# Patient Record
Sex: MALE | Race: BLACK OR AFRICAN AMERICAN | Employment: OTHER | URBAN - METROPOLITAN AREA
[De-identification: names, ages, dates, MRNs, and addresses within clinical notes are randomized per-mention and may not be internally consistent; named-entity substitution may affect disease eponyms.]

---

## 2018-02-07 ENCOUNTER — HOSPITAL ENCOUNTER (OUTPATIENT)
Dept: PREADMISSION TESTING | Age: 73
Discharge: HOME | End: 2018-02-07
Payer: MEDICARE

## 2018-02-07 VITALS — WEIGHT: 168 LBS | BODY MASS INDEX: 27 KG/M2 | HEIGHT: 66 IN

## 2018-02-07 LAB
ANION GAP SERPL CALC-SCNC: 12 MMOL/L (ref 3–18)
ATRIAL RATE: 84 BPM
BUN SERPL-MCNC: 19 MG/DL (ref 7–18)
BUN/CREAT SERPL: 14 (ref 12–20)
CALCIUM SERPL-MCNC: 11.2 MG/DL (ref 8.5–10.1)
CALCULATED P AXIS, ECG09: 53 DEGREES
CALCULATED R AXIS, ECG10: -13 DEGREES
CALCULATED T AXIS, ECG11: 30 DEGREES
CHLORIDE SERPL-SCNC: 100 MMOL/L (ref 100–108)
CO2 SERPL-SCNC: 26 MMOL/L (ref 21–32)
CREAT SERPL-MCNC: 1.36 MG/DL (ref 0.6–1.3)
DIAGNOSIS, 93000: NORMAL
GLUCOSE SERPL-MCNC: 137 MG/DL (ref 74–99)
HCT VFR BLD AUTO: 42.8 % (ref 36–48)
HGB BLD-MCNC: 14.8 G/DL (ref 13–16)
P-R INTERVAL, ECG05: 200 MS
POTASSIUM SERPL-SCNC: 3.7 MMOL/L (ref 3.5–5.5)
Q-T INTERVAL, ECG07: 360 MS
QRS DURATION, ECG06: 102 MS
QTC CALCULATION (BEZET), ECG08: 425 MS
SODIUM SERPL-SCNC: 138 MMOL/L (ref 136–145)
VENTRICULAR RATE, ECG03: 84 BPM

## 2018-02-07 PROCEDURE — 85018 HEMOGLOBIN: CPT | Performed by: UROLOGY

## 2018-02-07 PROCEDURE — 80048 BASIC METABOLIC PNL TOTAL CA: CPT | Performed by: UROLOGY

## 2018-02-07 PROCEDURE — 93005 ELECTROCARDIOGRAM TRACING: CPT

## 2018-02-07 RX ORDER — ATORVASTATIN CALCIUM 40 MG/1
40 TABLET, FILM COATED ORAL
COMMUNITY

## 2018-02-07 RX ORDER — CYCLOSPORINE 0.5 MG/ML
1 EMULSION OPHTHALMIC 2 TIMES DAILY
COMMUNITY

## 2018-02-07 RX ORDER — MELATONIN
8000 DAILY
COMMUNITY

## 2018-02-07 RX ORDER — LOSARTAN POTASSIUM 100 MG/1
100 TABLET ORAL DAILY
COMMUNITY

## 2018-02-07 RX ORDER — AMLODIPINE BESYLATE 10 MG/1
10 TABLET ORAL DAILY
COMMUNITY

## 2018-02-07 RX ORDER — ASPIRIN 81 MG/1
81 TABLET ORAL
COMMUNITY
End: 2021-04-13

## 2018-02-07 RX ORDER — TIZANIDINE HYDROCHLORIDE 4 MG/1
4 CAPSULE, GELATIN COATED ORAL
COMMUNITY
End: 2021-04-13

## 2018-02-07 RX ORDER — CHLORTHALIDONE 25 MG/1
25 TABLET ORAL EVERY OTHER DAY
Status: ON HOLD | COMMUNITY
End: 2022-10-28

## 2018-02-07 RX ORDER — MV/FA/DHA/EPA/FISH OIL/SAW/GNK 400MCG-200
400 COMBINATION PACKAGE (EA) ORAL DAILY
Status: ON HOLD | COMMUNITY
End: 2022-10-28

## 2018-02-07 RX ORDER — TAMSULOSIN HYDROCHLORIDE 0.4 MG/1
0.4 CAPSULE ORAL
COMMUNITY
End: 2019-09-12

## 2018-02-07 RX ORDER — OMEPRAZOLE 40 MG/1
40 CAPSULE, DELAYED RELEASE ORAL
COMMUNITY
End: 2021-04-13

## 2018-02-07 RX ORDER — IBUPROFEN 800 MG/1
800 TABLET ORAL
COMMUNITY
End: 2021-04-13

## 2018-02-07 RX ORDER — BISMUTH SUBSALICYLATE 262 MG
1 TABLET,CHEWABLE ORAL DAILY
COMMUNITY
End: 2021-04-13

## 2018-02-07 RX ORDER — SODIUM CHLORIDE, SODIUM LACTATE, POTASSIUM CHLORIDE, CALCIUM CHLORIDE 600; 310; 30; 20 MG/100ML; MG/100ML; MG/100ML; MG/100ML
125 INJECTION, SOLUTION INTRAVENOUS CONTINUOUS
Status: CANCELLED | OUTPATIENT
Start: 2018-02-07

## 2018-02-07 RX ORDER — CIPROFLOXACIN 500 MG/1
500 TABLET ORAL EVERY 12 HOURS
COMMUNITY
End: 2019-09-12

## 2018-02-07 RX ORDER — CIPROFLOXACIN 2 MG/ML
400 INJECTION, SOLUTION INTRAVENOUS ONCE
Status: CANCELLED | OUTPATIENT
Start: 2018-02-07 | End: 2018-02-07

## 2018-02-20 ENCOUNTER — HOSPITAL ENCOUNTER (OUTPATIENT)
Dept: ULTRASOUND IMAGING | Age: 73
Discharge: HOME | End: 2018-02-20
Attending: UROLOGY
Payer: MEDICARE

## 2018-02-20 ENCOUNTER — ANESTHESIA EVENT (OUTPATIENT)
Dept: SURGERY | Age: 73
End: 2018-02-20
Payer: MEDICARE

## 2018-02-20 ENCOUNTER — HOSPITAL ENCOUNTER (OUTPATIENT)
Age: 73
Setting detail: OUTPATIENT SURGERY
Discharge: HOME | End: 2018-02-20
Attending: UROLOGY | Admitting: UROLOGY
Payer: MEDICARE

## 2018-02-20 ENCOUNTER — ANESTHESIA (OUTPATIENT)
Dept: SURGERY | Age: 73
End: 2018-02-20
Payer: MEDICARE

## 2018-02-20 VITALS
SYSTOLIC BLOOD PRESSURE: 126 MMHG | RESPIRATION RATE: 18 BRPM | DIASTOLIC BLOOD PRESSURE: 68 MMHG | HEIGHT: 67 IN | WEIGHT: 165.38 LBS | HEART RATE: 78 BPM | OXYGEN SATURATION: 100 % | TEMPERATURE: 97.5 F | BODY MASS INDEX: 25.96 KG/M2

## 2018-02-20 DIAGNOSIS — R97.20 ELEVATED PSA: ICD-10-CM

## 2018-02-20 PROCEDURE — 74011000250 HC RX REV CODE- 250

## 2018-02-20 PROCEDURE — 77030032490 HC SLV COMPR SCD KNE COVD -B: Performed by: UROLOGY

## 2018-02-20 PROCEDURE — 76010000154 HC OR TIME FIRST 0.5 HR: Performed by: UROLOGY

## 2018-02-20 PROCEDURE — 74011250636 HC RX REV CODE- 250/636

## 2018-02-20 PROCEDURE — 74011250636 HC RX REV CODE- 250/636: Performed by: UROLOGY

## 2018-02-20 PROCEDURE — 76942 ECHO GUIDE FOR BIOPSY: CPT

## 2018-02-20 PROCEDURE — 77030018836 HC SOL IRR NACL ICUM -A: Performed by: UROLOGY

## 2018-02-20 PROCEDURE — 77030020782 HC GWN BAIR PAWS FLX 3M -B: Performed by: UROLOGY

## 2018-02-20 PROCEDURE — 77030003481 HC NDL BIOP GUN BARD -B: Performed by: UROLOGY

## 2018-02-20 PROCEDURE — 76060000031 HC ANESTHESIA FIRST 0.5 HR: Performed by: UROLOGY

## 2018-02-20 PROCEDURE — 76210000024 HC REC RM PH II 2.5 TO 3 HR: Performed by: UROLOGY

## 2018-02-20 PROCEDURE — G0416 PROSTATE BIOPSY, ANY MTHD: HCPCS | Performed by: UROLOGY

## 2018-02-20 RX ORDER — DIPHENHYDRAMINE HYDROCHLORIDE 50 MG/ML
12.5 INJECTION, SOLUTION INTRAMUSCULAR; INTRAVENOUS
Status: CANCELLED | OUTPATIENT
Start: 2018-02-20

## 2018-02-20 RX ORDER — CIPROFLOXACIN 2 MG/ML
400 INJECTION, SOLUTION INTRAVENOUS ONCE
Status: COMPLETED | OUTPATIENT
Start: 2018-02-20 | End: 2018-02-20

## 2018-02-20 RX ORDER — ONDANSETRON 2 MG/ML
4 INJECTION INTRAMUSCULAR; INTRAVENOUS ONCE
Status: CANCELLED | OUTPATIENT
Start: 2018-02-20 | End: 2018-02-20

## 2018-02-20 RX ORDER — HYDROMORPHONE HYDROCHLORIDE 2 MG/ML
0.5 INJECTION, SOLUTION INTRAMUSCULAR; INTRAVENOUS; SUBCUTANEOUS
Status: CANCELLED | OUTPATIENT
Start: 2018-02-20

## 2018-02-20 RX ORDER — PROPOFOL 10 MG/ML
INJECTION, EMULSION INTRAVENOUS AS NEEDED
Status: DISCONTINUED | OUTPATIENT
Start: 2018-02-20 | End: 2018-02-20 | Stop reason: HOSPADM

## 2018-02-20 RX ORDER — SODIUM CHLORIDE, SODIUM LACTATE, POTASSIUM CHLORIDE, CALCIUM CHLORIDE 600; 310; 30; 20 MG/100ML; MG/100ML; MG/100ML; MG/100ML
125 INJECTION, SOLUTION INTRAVENOUS CONTINUOUS
Status: DISCONTINUED | OUTPATIENT
Start: 2018-02-20 | End: 2018-02-20 | Stop reason: HOSPADM

## 2018-02-20 RX ORDER — FENTANYL CITRATE 50 UG/ML
INJECTION, SOLUTION INTRAMUSCULAR; INTRAVENOUS AS NEEDED
Status: DISCONTINUED | OUTPATIENT
Start: 2018-02-20 | End: 2018-02-20 | Stop reason: HOSPADM

## 2018-02-20 RX ORDER — NALOXONE HYDROCHLORIDE 0.4 MG/ML
0.1 INJECTION, SOLUTION INTRAMUSCULAR; INTRAVENOUS; SUBCUTANEOUS AS NEEDED
Status: CANCELLED | OUTPATIENT
Start: 2018-02-20

## 2018-02-20 RX ORDER — LIDOCAINE HYDROCHLORIDE 20 MG/ML
INJECTION, SOLUTION EPIDURAL; INFILTRATION; INTRACAUDAL; PERINEURAL AS NEEDED
Status: DISCONTINUED | OUTPATIENT
Start: 2018-02-20 | End: 2018-02-20 | Stop reason: HOSPADM

## 2018-02-20 RX ORDER — SODIUM CHLORIDE 0.9 % (FLUSH) 0.9 %
5-10 SYRINGE (ML) INJECTION AS NEEDED
Status: CANCELLED | OUTPATIENT
Start: 2018-02-20

## 2018-02-20 RX ORDER — SODIUM CHLORIDE, SODIUM LACTATE, POTASSIUM CHLORIDE, CALCIUM CHLORIDE 600; 310; 30; 20 MG/100ML; MG/100ML; MG/100ML; MG/100ML
150 INJECTION, SOLUTION INTRAVENOUS CONTINUOUS
Status: CANCELLED | OUTPATIENT
Start: 2018-02-20

## 2018-02-20 RX ORDER — HYDROCODONE BITARTRATE AND ACETAMINOPHEN 5; 325 MG/1; MG/1
1 TABLET ORAL AS NEEDED
Status: CANCELLED | OUTPATIENT
Start: 2018-02-20

## 2018-02-20 RX ORDER — GLYCOPYRROLATE 0.2 MG/ML
INJECTION INTRAMUSCULAR; INTRAVENOUS AS NEEDED
Status: DISCONTINUED | OUTPATIENT
Start: 2018-02-20 | End: 2018-02-20 | Stop reason: HOSPADM

## 2018-02-20 RX ORDER — MAGNESIUM SULFATE 100 %
4 CRYSTALS MISCELLANEOUS AS NEEDED
Status: CANCELLED | OUTPATIENT
Start: 2018-02-20

## 2018-02-20 RX ORDER — MIDAZOLAM HYDROCHLORIDE 1 MG/ML
INJECTION, SOLUTION INTRAMUSCULAR; INTRAVENOUS AS NEEDED
Status: DISCONTINUED | OUTPATIENT
Start: 2018-02-20 | End: 2018-02-20 | Stop reason: HOSPADM

## 2018-02-20 RX ORDER — ALBUTEROL SULFATE 0.83 MG/ML
2.5 SOLUTION RESPIRATORY (INHALATION) AS NEEDED
Status: CANCELLED | OUTPATIENT
Start: 2018-02-20

## 2018-02-20 RX ORDER — DEXTROSE 50 % IN WATER (D50W) INTRAVENOUS SYRINGE
25-50 AS NEEDED
Status: CANCELLED | OUTPATIENT
Start: 2018-02-20

## 2018-02-20 RX ADMIN — SODIUM CHLORIDE, SODIUM LACTATE, POTASSIUM CHLORIDE, AND CALCIUM CHLORIDE 125 ML/HR: 600; 310; 30; 20 INJECTION, SOLUTION INTRAVENOUS at 09:44

## 2018-02-20 RX ADMIN — FENTANYL CITRATE 50 MCG: 50 INJECTION, SOLUTION INTRAMUSCULAR; INTRAVENOUS at 10:30

## 2018-02-20 RX ADMIN — MIDAZOLAM HYDROCHLORIDE 2 MG: 1 INJECTION, SOLUTION INTRAMUSCULAR; INTRAVENOUS at 10:30

## 2018-02-20 RX ADMIN — PROPOFOL 30 MG: 10 INJECTION, EMULSION INTRAVENOUS at 10:39

## 2018-02-20 RX ADMIN — PROPOFOL 30 MG: 10 INJECTION, EMULSION INTRAVENOUS at 10:36

## 2018-02-20 RX ADMIN — CIPROFLOXACIN 400 MG: 2 INJECTION INTRAVENOUS at 10:34

## 2018-02-20 RX ADMIN — LIDOCAINE HYDROCHLORIDE 60 MG: 20 INJECTION, SOLUTION EPIDURAL; INFILTRATION; INTRACAUDAL; PERINEURAL at 10:36

## 2018-02-20 RX ADMIN — FENTANYL CITRATE 50 MCG: 50 INJECTION, SOLUTION INTRAMUSCULAR; INTRAVENOUS at 10:34

## 2018-02-20 RX ADMIN — SODIUM CHLORIDE, SODIUM LACTATE, POTASSIUM CHLORIDE, AND CALCIUM CHLORIDE 125 ML/HR: 600; 310; 30; 20 INJECTION, SOLUTION INTRAVENOUS at 11:57

## 2018-02-20 RX ADMIN — GLYCOPYRROLATE 0.2 MG: 0.2 INJECTION INTRAMUSCULAR; INTRAVENOUS at 10:35

## 2018-02-20 NOTE — DISCHARGE INSTRUCTIONS
Drink plenty of fluids  Regular diet  Shower tomorrow  Avoid heavy lifting, avoid straining,avoid constipation  Dr. Everette Saini office will call with follow up appointment  Contact Dr. Everette Saini office with any Post op questions or concerns at 36 - [phone] [facility] from Nurse    PATIENT INSTRUCTIONS:    After general anesthesia or intravenous sedation, for 24 hours or while taking prescription Narcotics:  · Limit your activities  · Do not drive and operate hazardous machinery  · Do not make important personal or business decisions  · Do  not drink alcoholic beverages  · If you have not urinated within 8 hours after discharge, please contact your surgeon on call. Report the following to your surgeon:  · Excessive pain, swelling, redness or odor of or around the surgical area  · Temperature over 100.5  · Nausea and vomiting lasting longer than 4 hours or if unable to take medications  · Any signs of decreased circulation or nerve impairment to extremity: change in color, persistent  numbness, tingling, coldness or increase pain  · Any questions    What to do at Home:  Recommended activity: Ambulate in house,    If you experience any of the following symptoms fever, chills, uncontrollable pain, active bleeding, difficulty urinating, please follow up with Dr. Luan Becker. *  Please give a list of your current medications to your Primary Care Provider. *  Please update this list whenever your medications are discontinued, doses are      changed, or new medications (including over-the-counter products) are added. *  Please carry medication information at all times in case of emergency situations. These are general instructions for a healthy lifestyle:    No smoking/ No tobacco products/ Avoid exposure to second hand smoke  Surgeon General's Warning:  Quitting smoking now greatly reduces serious risk to your health.     Obesity, smoking, and sedentary lifestyle greatly increases your risk for illness    A healthy diet, regular physical exercise & weight monitoring are important for maintaining a healthy lifestyle    You may be retaining fluid if you have a history of heart failure or if you experience any of the following symptoms:  Weight gain of 3 pounds or more overnight or 5 pounds in a week, increased swelling in our hands or feet or shortness of breath while lying flat in bed. Please call your doctor as soon as you notice any of these symptoms; do not wait until your next office visit. Recognize signs and symptoms of STROKE:    F-face looks uneven    A-arms unable to move or move unevenly    S-speech slurred or non-existent    T-time-call 911 as soon as signs and symptoms begin-DO NOT go       Back to bed or wait to see if you get better-TIME IS BRAIN. Warning Signs of HEART ATTACK     Call 911 if you have these symptoms:   Chest discomfort. Most heart attacks involve discomfort in the center of the chest that lasts more than a few minutes, or that goes away and comes back. It can feel like uncomfortable pressure, squeezing, fullness, or pain.  Discomfort in other areas of the upper body. Symptoms can include pain or discomfort in one or both arms, the back, neck, jaw, or stomach.  Shortness of breath with or without chest discomfort.  Other signs may include breaking out in a cold sweat, nausea, or lightheadedness. Don't wait more than five minutes to call 911 - MINUTES MATTER! Fast action can save your life. Calling 911 is almost always the fastest way to get lifesaving treatment. Emergency Medical Services staff can begin treatment when they arrive -- up to an hour sooner than if someone gets to the hospital by car. Patient armband removed and shredded    The discharge information has been reviewed with the patient and caregiver. The patient and caregiver verbalized understanding.   Discharge medications reviewed with the patient and caregiver and appropriate educational materials and side effects teaching were provided.   ___________________________________________________________________________________________________________________________________

## 2018-02-20 NOTE — PERIOP NOTES
Patient ambulatory to bathroom but unable to urinate. Given more PO fluids, and additional liter of LR infusing at this time.

## 2018-02-20 NOTE — ANESTHESIA PREPROCEDURE EVALUATION
Anesthetic History   No history of anesthetic complications            Review of Systems / Medical History  Patient summary reviewed, nursing notes reviewed and pertinent labs reviewed    Pulmonary  Within defined limits                 Neuro/Psych   Within defined limits           Cardiovascular    Hypertension: well controlled              Exercise tolerance: >4 METS     GI/Hepatic/Renal     GERD           Endo/Other        Arthritis     Other Findings              Physical Exam    Airway  Mallampati: II  TM Distance: 4 - 6 cm  Neck ROM: normal range of motion        Cardiovascular  Regular rate and rhythm,  S1 and S2 normal,  no murmur, click, rub, or gallop             Dental  No notable dental hx       Pulmonary  Breath sounds clear to auscultation               Abdominal  GI exam deferred       Other Findings            Anesthetic Plan    ASA: 2  Anesthesia type: general          Induction: Intravenous  Anesthetic plan and risks discussed with: Patient

## 2018-02-20 NOTE — H&P
A    [facility]  [address] [place] [address], [address] [place] [address], [address] [place] [address]  phone: [phone]  fax: [phone]          Patient: Mikie Luther  YOB: 1945      Assessment/Plan  # Detail Type Description    1. Assessment Elevated prostate specific antigen [PSA] (R97.20). Patient Plan He has a family history of prostate cancer and he has an elevated PSA. We will do a prostate bx in the OR. Risk of bleeding and infection were discussed. An rx fro Cipro was sent to [facility]. 2. Assessment Enlarged prostate with lower urinary tract symptoms (N40.1). Patient Plan He does have a pretty big prostate greater than 60g on LIAM. He did not see much improvement with his sx with Flomax. We discussed Rapaflo therapy vs surgical tx and he is not interested at this point. 3. Assessment Feeling of incomplete bladder emptying (R39.14). Patient Plan His PVR is 58ml he is not too bothered. This 81year old male presents for Elevated PSA and BPH. History of Present Illness:  1. Elevated PSA      The onset was approximately 1 month ago. The patient's PSA has risen since last monitored. Severity level is described as moderate. Reviewed today was a  PSA taken on 2018 with findings of 4.11 ng/mL. Pertinent history includes age over 48, family history of prostate cancer, prior prostatitis and prior UTIs. Associated symptoms include incomplete emptying, nocturia , slow stream, urinary urgency, urinary dribbling and frequent urination. Pertinent negatives include dysuria, hematuria, pelvic pain and pressure. Additional information: no recent UTI, the patient is sexually active and He ahs father and brother with a h/o prostate cancer (brother was about 61years old andfather  from it.)   No prior h/o prostate biopsy. He was told that prior PSA have been between 3-4. .      2.  BPH      Onset was gradual. Severity level is moderate-severe. It occurs daily. The problem is with no change. Associated symptoms include incomplete emptying, nocturia (3 times per night), slow stream, urgency, dribbling and urinary frequency. Pertinent negatives include chills, constipation, dysuria, fever, hematuria, pelvic pain, pelvic pressure and pressure. Additional information: Her has never had any BPH meds in the past.    He started flomax and has nocturia is 0-3 times and the flow is unchnged. He is not bothered at this point. PVR=58 (1/31/18). PAST MEDICAL/SURGICAL HISTORY   (Reviewed, updated)    Disease/disorder Onset Date Management Date Comments     cataract removal surgery     Arthritis       chronic dry eyes       High cholesterol       Hypoparathyroidism       Sciatica       Prostatitis       Hypertension             PROBLEM LIST:  Problem Description Onset Date   Hypoparathyroidism    Hypertension    High cholesterol    Arthritis    Dry eyes    Sciatica      Medication Reconciliation  Medications reconciled today.   Medication Reviewed  Adherence Medication Name Sig Desc Elsewhere Status   taking as directed amlodipine 10 mg tablet take 1 tablet by oral route  every day Y Verified   taking as directed atorvastatin 40 mg tablet take 1 tablet by oral route  every day Y Verified   taking as directed chlorthalidone 25 mg tablet take 0.5 tablet by oral route  every day Y Verified   taking as directed ibuprofen 800 mg tablet take 1 tablet by oral route 3 times every day with food Y Verified   taking as directed Restasis 0.05 % eye drops in a dropperette instill 1 drop by ophthalmic route  every 12 hours into affected eye(s) Y Verified   taking as directed tizanidine 4 mg tablet take 1 tablet by oral route  every 8 hours as needed not to exceed 3 doses in 24 hours Y Verified   taking as directed Nature's Tears 0.1 %-0.3 % eye drops use as directed N Verified   taking as directed Viagra 50 mg tablet take 1 tablet by oral route  every day as needed approximately 1 hour before sexual activity Y Verified   taking as directed GenTeal Tears (dxtrn-hpm-gly) 0.1 %-0.3 %-0.2 % eye drops use as directed N Verified   taking as directed CoQ-10 100 mg capsule take 1 capsule by mouth once daily N Verified   taking as directed multivitamin tablet take 1 tablet by oral route  every day with food N Verified   taking as directed krill oil 1,000 mg-170 mg-50 mg-80 mg capsule take 1 capsule by mouth once daily N Verified   taking as directed Vitamin D3 400 unit tablet take 1 tablet by  mouth once daily N Verified   taking as directed Flomax 0.4 mg capsule take 1 capsule by oral route  every day 1/2 hour following the same meal each day N Verified     Allergies  Ingredient Reaction Medication Name Comment   NO KNOWN ALLERGIES        (Reviewed, no changes.)  Family History:  (Reviewed, updated)  Relationship Family Member Name  Age at Death Condition Onset Age Cause of Death   Family h/o    Cancer - prostate     Family h/o    Hypertension     Family h/o    Diabetes     Mother    Kidney disease  Y         Social History:  (Reviewed, updated)  Tobacco use reviewed. The patient is right-handed. Preferred language is [place]. MARITAL STATUS/FAMILY/SOCIAL SUPPORT  Currently . CHILDREN  Has children:  Tobacco use status: Ex-cigarette smoker. Smoking status: Former smoker. SMOKING STATUS  Use Status Type Smoking Status Usage Per Day Years Used Total Pack Years   yes Cigarette Former smoker          No passive smoke exposure. ALCOHOL  There is a history of alcohol use. Type: Beer. consumed occasionally. CAFFEINE  The patient uses caffeine: coffee and tea, soda. Arvid Tyler LIFESTYLE  Moderate activity level. SLEEP PATTERNS  Patient has no changes to sleep patterns. HOME ENVIRONMENT/SAFETY  The home has smoke detectors. Uses seat belts. Review of Systems  System Neg/Pos Details   Constitutional Negative Chills and fever.    ENMT Negative Ear infections and sore throat. Eyes Negative Blurred vision, double vision and eye pain. Respiratory Negative Asthma, chronic cough, dyspnea and wheezing. Cardio Negative Chest pain. GI Negative Constipation, decreased appetite, diarrhea, nausea and vomiting.  Positive Incomplete emptying, Nocturia, Slow stream, Urgency, Urinary dribbling, Urinary frequency.  Negative Dysuria, hematuria, pelvic pain, pelvic pressure and pressure. Endocrine Negative Cold intolerance, heat intolerance, increased thirst and weight loss. Neuro Negative Headache and tremors. Psych Negative Anxiety and depression. Integumentary Negative Itching skin and rash. MS Negative Back pain and joint pain. Hema/Lymph Negative Easy bleeding. Reproductive Negative Sexual dysfunction. Physical Exam  Exam Findings Details   Constitutional Normal Well developed. Neck Exam Normal Inspection - Normal.   Respiratory Normal Inspection - Normal.   Abdomen Normal No abdominal tenderness. Genitourinary Normal No CVA tenderness. No suprapubic tenderness. Extremity Normal No edema. Psychiatric Normal Oriented to time, place, person and situation. Appropriate mood and affect. Procedures:  Uroflow:  Feeling of incomplete bladder emptying R39.14. Consent was obtained. The procedure and risks were explained in detail. Questions were encouraged and answered. Patient was prepped and draped in the usual fashion. Procedure:   Sonographic residual urine: 58mL. Time after void: 1 Minute. Comments:. Physician: Man Joshua MD. Date: 01/31/2018. Time: 10:44 AM.  Post procedure: Instructions: Raenell Mortimer       Medications (added, continued, or stopped today)  Started Medication Directions Instruction Stopped   12/11/2017 amlodipine 10 mg tablet take 1 tablet by oral route  every day     12/05/2017 atorvastatin 40 mg tablet take 1 tablet by oral route  every day     12/05/2017 chlorthalidone 25 mg tablet take 0.5 tablet by oral route  every day 01/31/2018 Cipro 500 mg tablet take 1 by oral route once for 12 hours start day prior to biopsy Start taking on the day prior to the biopsy    12/14/2017 CoQ-10 100 mg capsule take 1 capsule by mouth once daily     12/14/2017 Flomax 0.4 mg capsule take 1 capsule by oral route  every day 1/2 hour following the same meal each day     12/14/2017 GenTeal Tears (dxtrn-hpm-gly) 0.1 %-0.3 %-0.2 % eye drops use as directed     11/02/2017 ibuprofen 800 mg tablet take 1 tablet by oral route 3 times every day with food     12/14/2017 krill oil 1,000 mg-170 mg-50 mg-80 mg capsule take 1 capsule by mouth once daily     12/14/2017 multivitamin tablet take 1 tablet by oral route  every day with food     12/14/2017 Nature's Tears 0.1 %-0.3 % eye drops use as directed     09/21/2017 Restasis 0.05 % eye drops in a dropperette instill 1 drop by ophthalmic route  every 12 hours into affected eye(s)     11/02/2017 tizanidine 4 mg tablet take 1 tablet by oral route  every 8 hours as needed not to exceed 3 doses in 24 hours     10/16/2017 Viagra 50 mg tablet take 1 tablet by oral route  every day as needed approximately 1 hour before sexual activity     12/14/2017 Vitamin D3 400 unit tablet take 1 tablet by  mouth once daily     Completed by:              Tony Aguiar MD

## 2018-02-20 NOTE — IP AVS SNAPSHOT
Cameron Floyd 
 
 
 [address] [place] [id number] Patient: Jasmin Chou MRN: [id number] About your hospitalization You were admitted on:  February 20, 2018 You last received care in the:  [facility] PHASE 2 RECOVERY You were discharged on:  February 20, 2018 Why you were hospitalized Your primary diagnosis was:  Elevated Psa Follow-up Information Follow up With Details Comments Contact Info Melyssa Marinelli MD   1661 [address] [facility] A [address] [place] [address] 
[phone] Cathi Chapin MD  office will call with follow up appointment [address] [facility] 107 1344 [facility] 
[phone] Discharge Orders None A check juan j indicates which time of day the medication should be taken. My Medications CONTINUE taking these medications Instructions Each Dose to Equal  
 Morning Noon Evening Bedtime  
 amLODIPine 10 mg tablet Commonly known as:  Caitlin James Your last dose was: Your next dose is: Take 10 mg by mouth daily. 10 mg  
    
   
   
   
  
 aspirin delayed-release 81 mg tablet Your last dose was: Your next dose is: Take 81 mg by mouth daily. 81 mg  
    
   
   
   
  
 chlorthalidone 25 mg tablet Commonly known as:  Burke Torres Your last dose was: Your next dose is: Take 25 mg by mouth every other day. 25 mg  
    
   
   
   
  
 CIPRO 500 mg tablet Generic drug:  ciprofloxacin HCl Your last dose was: Your next dose is: Take 500 mg by mouth every twelve (12) hours. Starting the day before surgery 500 mg  
    
   
   
   
  
 COQ10 (UBIQUINOL) PO Your last dose was: Your next dose is: Take  by mouth. 1 Teaspoon oral route daily GENTEAL TEARS OP Your last dose was: Your next dose is: Apply 1 Drop to eye nightly. Both eyes 1 Drop  
    
   
   
   
  
 ibuprofen 800 mg tablet Commonly known as:  MOTRIN Your last dose was: Your next dose is: Take 600 mg by mouth two (2) times daily as needed for Pain. 600 mg  
    
   
   
   
  
 krill oil 500 mg Cap Your last dose was: Your next dose is: Take 500 mg by mouth daily. 500 mg  
    
   
   
   
  
 LIPITOR 40 mg tablet Generic drug:  atorvastatin Your last dose was: Your next dose is: Take 40 mg by mouth nightly. 40 mg  
    
   
   
   
  
 losartan 100 mg tablet Commonly known as:  COZAAR Your last dose was: Your next dose is: Take 100 mg by mouth daily. 100 mg  
    
   
   
   
  
 multivitamin tablet Commonly known as:  ONE A DAY Your last dose was: Your next dose is: Take 1 Tab by mouth daily. 1 Tab NATURAL TEARS (PF) OP Your last dose was: Your next dose is:    
   
   
 Apply 1 Drop to eye two (2) times a day. Both eyes 1 Drop  
    
   
   
   
  
 omeprazole 40 mg capsule Commonly known as:  PRILOSEC Your last dose was: Your next dose is: Take 40 mg by mouth Daily (before breakfast). 40 mg  
    
   
   
   
  
 psyllium Powd Commonly known as:  METAMUCIL Your last dose was: Your next dose is: Take  by mouth. 1 Tablespoon in 8 ounces glass oral route of water every other day RESTASIS 0.05 % ophthalmic emulsion Generic drug:  cycloSPORINE Your last dose was: Your next dose is:    
   
   
 Administer 1 Drop to both eyes two (2) times a day. 1 Drop  
    
   
   
   
  
 tamsulosin 0.4 mg capsule Commonly known as:  FLOMAX Your last dose was: Your next dose is: Take 0.4 mg by mouth daily (after dinner). 0.4 mg  
    
   
   
   
  
 tiZANidine 4 mg capsule Commonly known as:  Amaris Herzog Your last dose was: Your next dose is: Take 4 mg by mouth two (2) times daily as needed. 4 mg VIAGRA PO Your last dose was: Your next dose is: Take 40 mg by mouth as needed. 40 mg  
    
   
   
   
  
 VITAMIN D3 1,000 unit tablet Generic drug:  cholecalciferol Your last dose was: Your next dose is: Take 8,000 Units by mouth daily. 8000 Units Discharge Instructions Drink plenty of fluids Regular diet Shower tomorrow Avoid heavy lifting, avoid straining,avoid constipation Dr. Joaquín Ramachandran office will call with follow up appointment Contact Dr. Joaquín Ramachandran office with any Post op questions or concerns at [phone] DISCHARGE SUMMARY from Nurse PATIENT INSTRUCTIONS: 
 
 
F-face looks uneven A-arms unable to move or move unevenly S-speech slurred or non-existent T-time-call 911 as soon as signs and symptoms begin-DO NOT go Back to bed or wait to see if you get better-TIME IS BRAIN. Warning Signs of HEART ATTACK Call 911 if you have these symptoms: 
? Chest discomfort. Most heart attacks involve discomfort in the center of the chest that lasts more than a few minutes, or that goes away and comes back. It can feel like uncomfortable pressure, squeezing, fullness, or pain. ? Discomfort in other areas of the upper body. Symptoms can include pain or discomfort in one or both arms, the back, neck, jaw, or stomach. ? Shortness of breath with or without chest discomfort. ? Other signs may include breaking out in a cold sweat, nausea, or lightheadedness. Don't wait more than five minutes to call [address]! Fast action can save your life. Calling 911 is almost always the fastest way to get lifesaving treatment. Emergency Medical Services staff can begin treatment when they arrive  up to an hour sooner than if someone gets to the hospital by car. Patient armband removed and shredded The discharge information has been reviewed with the patient and caregiver. The patient and caregiver verbalized understanding. Discharge medications reviewed with the patient and caregiver and appropriate educational materials and side effects teaching were provided. ___________________________________________________________________________________________________________________________________ Introducing [facility] & HEALTH SERVICES! Flakita Noel introduces adflyer patient portal. Now you can access parts of your medical record, email your doctor's office, and request medication refills online. 1. In your internet browser, go to [email or web address] 2. Click on the First Time User? Click Here link in the Sign In box. You will see the New Member Sign Up page. 3. Enter your adflyer Access Code exactly as it appears below. You will not need to use this code after youve completed the sign-up process. If you do not sign up before the expiration date, you must request a new code. · adflyer Access Code: [email or web address] Expires: 5/3/2018  5:57 PM 
 
4. Enter the last four digits of your Social Security Number (xxxx) and Date of Birth (mm/dd/yyyy) as indicated and click Submit. You will be taken to the next sign-up page. 5. Create a adflyer ID. This will be your MyChart login ID and cannot be changed, so think of one that is secure and easy to remember. 6. Create a adflyer password. You can change your password at any time. 7. Enter your Password Reset Question and Answer. This can be used at a later time if you forget your password. 8. Enter your e-mail address. You will receive e-mail notification when new information is available in [address] 9. Click Sign Up. You can now view and download portions of your medical record. 10. Click the Download Summary menu link to download a portable copy of your medical information. If you have questions, please visit the Frequently Asked Questions section of the Netadmin website. Remember, Netadmin is NOT to be used for urgent needs. For medical emergencies, dial 911. Now available from your iPhone and Android! Providers Seen During Your Hospitalization Provider Specialty Primary office phone Maria Fernanda Rios MD Urology [phone] Your Primary Care Physician (PCP) Primary Care Physician Office Phone Office Fax Martin Hawleycrys [phone] You are allergic to the following No active allergies Recent Documentation Height Weight BMI Smoking Status 1.702 m 75 kg 25.9 kg/m2 Former Smoker Emergency Contacts Name Discharge Info Relation Home Work Mobile Pretty Rowell DISCHARGE CAREGIVER [3] Spouse [3]   [phone] Patient Belongings The following personal items are in your possession at time of discharge: 
  Dental Appliances: At home, Lowers  Visual Aid: Glasses      Home Medications: None   Jewelry: None  Clothing: Pants, Shirt, Footwear, Undergarments (locker 10)    Other Valuables: Siobhan Matthews (with spouse) Please provide this summary of care documentation to your next provider. Signatures-by signing, you are acknowledging that this After Visit Summary has been reviewed with you and you have received a copy. Patient Signature:  ____________________________________________________________ Date:  ____________________________________________________________  
  
Juan Miguel Kin Provider Signature:  ____________________________________________________________ Date:  ____________________________________________________________

## 2018-02-20 NOTE — ANESTHESIA POSTPROCEDURE EVALUATION
Post-Anesthesia Evaluation and Assessment    Cardiovascular Function/Vital Signs  Visit Vitals    /68    Pulse 78    Temp 36.4 °C (97.5 °F)    Resp 18    Ht 5' 7\" (1.702 m)    Wt 75 kg (165 lb 6 oz)    SpO2 100%    BMI 25.9 kg/m2       Patient is status post Procedure(s):  TRANSRECTAL ULTRASOUND GUIDED PROSTATE BIOPSY . Nausea/Vomiting: Controlled. Postoperative hydration reviewed and adequate. Pain:  Pain Scale 1: Numeric (0 - 10) (02/20/18 1326)  Pain Intensity 1: 0 (02/20/18 1326)   Managed. Neurological Status:   Neuro (WDL): Within Defined Limits (02/20/18 1326)   At baseline. Mental Status and Level of Consciousness: Arousable. Pulmonary Status:   O2 Device: Room air (02/20/18 1326)   Adequate oxygenation and airway patent. Complications related to anesthesia: None    Post-anesthesia assessment completed. No concerns. Patient has met all discharge requirements. Signed By: Jenn Curry CRNA    February 20, 2018           Post-Anesthesia Evaluation and Assessment    Cardiovascular Function/Vital Signs  Visit Vitals    /68    Pulse 78    Temp 36.4 °C (97.5 °F)    Resp 18    Ht 5' 7\" (1.702 m)    Wt 75 kg (165 lb 6 oz)    SpO2 100%    BMI 25.9 kg/m2       Patient is status post Procedure(s):  TRANSRECTAL ULTRASOUND GUIDED PROSTATE BIOPSY . Nausea/Vomiting: Controlled. Postoperative hydration reviewed and adequate. Pain:  Pain Scale 1: Numeric (0 - 10) (02/20/18 1326)  Pain Intensity 1: 0 (02/20/18 1326)   Managed. Neurological Status:   Neuro (WDL): Within Defined Limits (02/20/18 1326)   At baseline. Mental Status and Level of Consciousness: Arousable. Pulmonary Status:   O2 Device: Room air (02/20/18 1326)   Adequate oxygenation and airway patent. Complications related to anesthesia: None    Post-anesthesia assessment completed. No concerns. Patient has met all discharge requirements.     Signed By: Jenn Curry CRNA    February 20, 2018

## 2019-09-13 ENCOUNTER — HOSPITAL ENCOUNTER (OUTPATIENT)
Dept: PREADMISSION TESTING | Age: 74
Discharge: HOME | End: 2019-09-13
Attending: UROLOGY
Payer: MEDICARE

## 2019-09-13 DIAGNOSIS — Z01.818 OTHER SPECIFIED PRE-OPERATIVE EXAMINATION: ICD-10-CM

## 2019-09-13 DIAGNOSIS — R97.20 ELEVATED PROSTATE SPECIFIC ANTIGEN (PSA): ICD-10-CM

## 2019-09-13 LAB
ANION GAP SERPL CALC-SCNC: 5 MMOL/L (ref 3–18)
ATRIAL RATE: 70 BPM
BUN SERPL-MCNC: 20 MG/DL (ref 7–18)
BUN/CREAT SERPL: 13 (ref 12–20)
CALCIUM SERPL-MCNC: 10 MG/DL (ref 8.5–10.1)
CALCULATED P AXIS, ECG09: 54 DEGREES
CALCULATED R AXIS, ECG10: -5 DEGREES
CALCULATED T AXIS, ECG11: 4 DEGREES
CHLORIDE SERPL-SCNC: 106 MMOL/L (ref 100–111)
CO2 SERPL-SCNC: 29 MMOL/L (ref 21–32)
CREAT SERPL-MCNC: 1.49 MG/DL (ref 0.6–1.3)
DIAGNOSIS, 93000: NORMAL
GLUCOSE SERPL-MCNC: 156 MG/DL (ref 74–99)
HCT VFR BLD AUTO: 41.7 % (ref 36–48)
HGB BLD-MCNC: 14.5 G/DL (ref 13–16)
P-R INTERVAL, ECG05: 210 MS
POTASSIUM SERPL-SCNC: 3.8 MMOL/L (ref 3.5–5.5)
Q-T INTERVAL, ECG07: 382 MS
QRS DURATION, ECG06: 104 MS
QTC CALCULATION (BEZET), ECG08: 412 MS
SODIUM SERPL-SCNC: 140 MMOL/L (ref 136–145)
VENTRICULAR RATE, ECG03: 70 BPM

## 2019-09-13 PROCEDURE — 80048 BASIC METABOLIC PNL TOTAL CA: CPT

## 2019-09-13 PROCEDURE — 85018 HEMOGLOBIN: CPT

## 2019-09-13 PROCEDURE — 93005 ELECTROCARDIOGRAM TRACING: CPT

## 2019-09-13 PROCEDURE — 36415 COLL VENOUS BLD VENIPUNCTURE: CPT

## 2019-09-16 ENCOUNTER — HOSPITAL ENCOUNTER (OUTPATIENT)
Dept: MRI IMAGING | Age: 74
Discharge: HOME | End: 2019-09-16
Attending: UROLOGY
Payer: MEDICARE

## 2019-09-16 VITALS — BODY MASS INDEX: 25.53 KG/M2 | WEIGHT: 163 LBS

## 2019-09-16 DIAGNOSIS — R97.20 ELEVATED PSA: ICD-10-CM

## 2019-09-16 PROCEDURE — 74011250636 HC RX REV CODE- 250/636: Performed by: UROLOGY

## 2019-09-16 PROCEDURE — 72197 MRI PELVIS W/O & W/DYE: CPT

## 2019-09-16 PROCEDURE — A9575 INJ GADOTERATE MEGLUMI 0.1ML: HCPCS | Performed by: UROLOGY

## 2019-09-16 RX ORDER — GADOTERATE MEGLUMINE 376.9 MG/ML
15 INJECTION INTRAVENOUS
Status: COMPLETED | OUTPATIENT
Start: 2019-09-16 | End: 2019-09-16

## 2019-09-16 RX ADMIN — GADOTERATE MEGLUMINE 15 ML: 376.9 INJECTION INTRAVENOUS at 16:17

## 2019-09-23 NOTE — H&P
Urology [facility]  [address] [address]  [address]  Tel: [phone]  Fax: [phone]        Patient: Rosamaria Thakur  YOB: 1945          Assessment/Plan  # Detail Type Description    1. Assessment Elevated prostate specific antigen [PSA] (R97.20). Patient Plan His PSA remains quite elevated. We discussed this could represent a benign or malignant condition. He's already had a neg bx. We will check an MRI of the prostate and do a saturation bx thereafter. Risk of bleeding and infection were discussed. Rx for Cipro and Enema were given. 2. Assessment Enlarged prostate with lower urinary tract symptoms (N40.1). Patient Plan Overall, he's fairly happy with how he voids. He will continue Finasteride. 3. Assessment Poor urinary stream (R39.12). Patient Plan He's not too bothered. 4. Assessment Feeling of incomplete bladder emptying (R39.14). Patient Plan He's doing fine in this regard. 5. Assessment Sebaceous cyst (L72.3). Patient Plan He has a small scrotal sebaceous cyst.  No need for intervention right now. This 69year old male presents for Elevated PSA and BPH. History of Present Illness:  1. Elevated PSA   The onset was approximately 1 year ago. The patient's PSA has risen since last monitored. Severity level is described as moderate. Reviewed today was a PSA taken on 2018 with findings of 4.11 ng/mL. PSA taken on 2018 with findings of 2.96 ng/mL. Pertinent history includes age over 48. Associated symptoms include incomplete emptying, nocturia , slow stream, urinary urgency, urinary dribbling and urinary frequency. Pertinent negatives include dysuria, hematuria, pelvic pain and pressure.  Additional information: no recent UTI, the patient is sexually active, He ahs father and brother with a h/o prostate cancer (brother was about [address] old andfather  from it.)   He was told that prior PSA have been between 3-4. Path (2/20/18) = negative        He is on fiansteride for over 1 year and now PSA is increased:   3.69 (7.38 corrected, 2/27/19)    4.294 (8.6 corrected and 8/2019). 2.  BPH   Onset was gradual. Severity level is moderate-severe. It occurs daily. The problem is improving. Associated symptoms include incomplete emptying, nocturia (3 times per night), slow stream, urgency, dribbling and urinary frequency. Pertinent negatives include chills, constipation, dysuria, fever, hematuria, pelvic pain, pelvic pressure and pressure. Additional information: Her has never had any BPH meds in the past.    He started flomax and has nocturia is 0-3 times and the flow is unchnged. PVR=58 (1/31/18)  0ml (9/2018)     PVR=59  (3/2019)      65ml (8/2019)       8/2019 -- He is vidng fine. flow is god. Nocturia is usually 0 but rarely up to 3. No straining. .              Medical/Surgical/Interim History  Reviewed, no change. Last detailed document date:09/10/2018. PROBLEM LIST:   Problem List reviewed.    Problem Description Onset Date Chronic Clinical Status Notes   Hypoparathyroidism  Y     Hypertension  Y     High cholesterol  Y     Arthritis  Y     Dry eyes  Y     Sciatica  Y           Medications (active prior to today)  Medication Instructions Start Date Stop Date Refilled Elsewhere   amlodipine 10 mg tablet take 1 tablet by oral route  every day 12/11/2017   Y   atorvastatin 40 mg tablet take 1 tablet by oral route  every day 12/05/2017   Y   chlorthalidone 25 mg tablet take 0.5 tablet by oral route  every day 12/05/2017   Y   ibuprofen 800 mg tablet take 1 tablet by oral route 3 times every day with food 11/02/2017   Y   Restasis 0.05 % eye drops in a dropperette instill 1 drop by ophthalmic route  every 12 hours into affected eye(s) 09/21/2017   Y   tizanidine 4 mg tablet take 1 tablet by oral route  every 8 hours as needed not to exceed 3 doses in 24 hours 11/02/2017   Y   Viagra 50 mg tablet take 1 tablet by oral route  every day as needed approximately 1 hour before sexual activity 10/16/2017   Y   Nature's Tears 0.1 %-0.3 % eye drops use as directed 12/14/2017   N   GenTeal Tears (dxtrn-hpm-gly) 0.1 %-0.3 %-0.2 % eye drops use as directed 12/14/2017   N   CoQ-10 100 mg capsule take 1 capsule by mouth once daily 12/14/2017   N   krill oil 1,000 mg-170 mg-50 mg-80 mg capsule take 1 capsule by mouth once daily 12/14/2017   N   multivitamin tablet take 1 tablet by oral route  every day with food 12/14/2017   N   Vitamin D3 400 unit tablet take 1 tablet by  mouth once daily 12/14/2017   N   aspirin 81 mg tablet,delayed release take 1 tablet by oral route  every day //   Y   losartan 100 mg tablet take 1 tablet by oral route  every day //   Y   Voltaren 1 % topical gel apply (2G)  by topical route 4 times every day to the affected area(s) //   Y   finasteride 5 mg tablet take 1 tablet by oral route  every day 03/14/2019 03/14/2019 N     Medication Reconciliation  Medications reconciled today.   Medication Reviewed  Adherence Medication Name Sig Desc Elsewhere Status   taking as directed amlodipine 10 mg tablet take 1 tablet by oral route  every day Y Verified   taking as directed atorvastatin 40 mg tablet take 1 tablet by oral route  every day Y Verified   taking as directed chlorthalidone 25 mg tablet take 0.5 tablet by oral route  every day Y Verified   taking as directed ibuprofen 800 mg tablet take 1 tablet by oral route 3 times every day with food Y Verified   taking as directed Restasis 0.05 % eye drops in a dropperette instill 1 drop by ophthalmic route  every 12 hours into affected eye(s) Y Verified   taking as directed Viagra 50 mg tablet take 1 tablet by oral route  every day as needed approximately 1 hour before sexual activity Y Verified   taking as directed tizanidine 4 mg tablet take 1 tablet by oral route  every 8 hours as needed not to exceed 3 doses in 24 hours Y Verified taking as directed Nature's Tears 0.1 %-0.3 % eye drops use as directed N Verified   taking as directed GenTeal Tears (dxtrn-hpm-gly) 0.1 %-0.3 %-0.2 % eye drops use as directed N Verified   taking as directed CoQ-10 100 mg capsule take 1 capsule by mouth once daily N Verified   taking as directed krill oil 1,000 mg-170 mg-50 mg-80 mg capsule take 1 capsule by mouth once daily N Verified   taking as directed multivitamin tablet take 1 tablet by oral route  every day with food N Verified   taking as directed Vitamin D3 400 unit tablet take 1 tablet by  mouth once daily N Verified   taking as directed aspirin 81 mg tablet,delayed release take 1 tablet by oral route  every day Y Verified   taking as directed losartan 100 mg tablet take 1 tablet by oral route  every day Y Verified   taking as directed Voltaren 1 % topical gel apply (2G)  by topical route 4 times every day to the affected area(s) Y Verified   taking as directed finasteride 5 mg tablet take 1 tablet by oral route  every day N Verified     Allergies  Ingredient Reaction (Severity) Medication Name Comment   NO KNOWN ALLERGIES        Reviewed, no changes. Family History:  Reviewed, no changes. Last detailed document date:09/10/2018. Social History:  Reviewed, no changes. Last detailed document date: 09/10/2018. Review of Systems  System Neg/Pos Details   Constitutional Negative Chills and Fever. ENMT Negative Ear infections and Sore throat. Eyes Negative Blurred vision, Double vision and Eye pain. Respiratory Negative Asthma, Chronic cough, Dyspnea and Wheezing. Cardio Negative Chest pain. GI Negative Constipation, Decreased appetite, Diarrhea, Nausea and Vomiting.  Positive Incomplete emptying, Nocturia, Slow stream, Urgency, Urinary dribbling, Urinary frequency.  Negative Dysuria, Hematuria, Pelvic pain, Pelvic pressure and Pressure.    Endocrine Negative Cold intolerance, Heat intolerance, Increased thirst and Weight loss.   Neuro Negative Headache and Tremors. Psych Negative Anxiety and Depression. Integumentary Negative Itching skin and Rash. MS Negative Back pain and Joint pain. Hema/Lymph Negative Easy bleeding. Reproductive Negative Sexual dysfunction. Vital Signs     Height  Time ft in cm Last Measured Height Position   9:47 AM 5.0 7.00 170.18 12/14/2017 Standing     Weight/BSA/BMI  Time lb oz kg Context BMI kg/m2 BSA m2   9:47 .00  73.936  25.53      Measured By  Time Measured by   9:47 AM Xochilt Brar       Physical Exam  Exam Findings Details   Constitutional Normal Well developed. Neck Exam Normal Inspection - Normal.   Respiratory Normal Inspection - Normal.   Abdomen Normal No abdominal tenderness. Genitourinary Normal Penis - Normal. Urethral meatus - Normal. Scrotum - Normal. Epididymides - Normal. Lymph nodes - Normal. Testes - Normal. No CVA tenderness. No suprapubic tenderness. Rectal Normal Anus - Normal. Sphincter - Normal. Prostate - Normal.   Extremity Normal No edema. Psychiatric Normal Orientation - Oriented to time, place, person & situation. Appropriate mood and affect. Procedures:    Uroflow:  Feeling of incomplete bladder emptying R39.14. Procedure:   Sonographic residual urine: 65mL. Time after void: 4 Minutes. Comments:. Physician: Althea Suarez MD. Date: 08/29/2019. Time: 9:47 AM.  Post procedure: Instructions:. Patient Education  # Patient Education   1.  Prostate-Specific Antigen (PSA) Test:~     Medications (added, continued, or stopped today)  Start Date Medication Directions PRN Status PRN Reason Instruction Stop Date   12/11/2017 amlodipine 10 mg tablet take 1 tablet by oral route  every day N       aspirin 81 mg tablet,delayed release take 1 tablet by oral route  every day N      12/05/2017 atorvastatin 40 mg tablet take 1 tablet by oral route  every day N      12/05/2017 chlorthalidone 25 mg tablet take 0.5 tablet by oral route  every day N 08/29/2019 Cipro 500 mg tablet take 1 by oral route once for 12 hours start day prior to biopsy N  Start taking on the day prior to the biopsy    12/14/2017 CoQ-10 100 mg capsule take 1 capsule by mouth once daily N      03/14/2019 finasteride 5 mg tablet take 1 tablet by oral route  every day N      08/29/2019 Fleet Enema 19 gram-7 gram/118 mL take by Rectal route 3 hours prior to biopsy N      12/14/2017 GenTeal Tears (dxtrn-hpm-gly) 0.1 %-0.3 %-0.2 % eye drops use as directed N      11/02/2017 ibuprofen 800 mg tablet take 1 tablet by oral route 3 times every day with food N      12/14/2017 krill oil 1,000 mg-170 mg-50 mg-80 mg capsule take 1 capsule by mouth once daily N       losartan 100 mg tablet take 1 tablet by oral route  every day N      12/14/2017 multivitamin tablet take 1 tablet by oral route  every day with food N      12/14/2017 Nature's Tears 0.1 %-0.3 % eye drops use as directed N      09/21/2017 Restasis 0.05 % eye drops in a dropperette instill 1 drop by ophthalmic route  every 12 hours into affected eye(s) N      11/02/2017 tizanidine 4 mg tablet take 1 tablet by oral route  every 8 hours as needed not to exceed 3 doses in 24 hours N      10/16/2017 Viagra 50 mg tablet take 1 tablet by oral route  every day as needed approximately 1 hour before sexual activity N      12/14/2017 Vitamin D3 400 unit tablet take 1 tablet by  mouth once daily N       Voltaren 1 % topical gel apply (2G)  by topical route 4 times every day to the affected area(s) N      Active Patient Care Team Members    Name Contact Agency Type Support Role Relationship Active Date Inactive Date [facility]   Emergency Contact Spouse      Agueda Robertson   Patient provider PCP          Provider:         Sowmya Eisenberg MD

## 2019-09-24 ENCOUNTER — HOSPITAL ENCOUNTER (OUTPATIENT)
Age: 74
Setting detail: OUTPATIENT SURGERY
Discharge: HOME | End: 2019-09-24
Attending: UROLOGY | Admitting: UROLOGY
Payer: MEDICARE

## 2019-09-24 ENCOUNTER — ANESTHESIA (OUTPATIENT)
Dept: SURGERY | Age: 74
End: 2019-09-24
Payer: MEDICARE

## 2019-09-24 ENCOUNTER — APPOINTMENT (OUTPATIENT)
Dept: ULTRASOUND IMAGING | Age: 74
End: 2019-09-24
Attending: UROLOGY
Payer: MEDICARE

## 2019-09-24 ENCOUNTER — ANESTHESIA EVENT (OUTPATIENT)
Dept: SURGERY | Age: 74
End: 2019-09-24
Payer: MEDICARE

## 2019-09-24 VITALS
DIASTOLIC BLOOD PRESSURE: 78 MMHG | RESPIRATION RATE: 18 BRPM | HEART RATE: 70 BPM | TEMPERATURE: 97.3 F | SYSTOLIC BLOOD PRESSURE: 122 MMHG | HEIGHT: 67 IN | OXYGEN SATURATION: 100 % | WEIGHT: 166.56 LBS | BODY MASS INDEX: 26.14 KG/M2

## 2019-09-24 DIAGNOSIS — R97.20 ABNORMAL PROSTATE SPECIFIC ANTIGEN (PSA): ICD-10-CM

## 2019-09-24 LAB — GLUCOSE BLD STRIP.AUTO-MCNC: 126 MG/DL (ref 70–110)

## 2019-09-24 PROCEDURE — 74011250636 HC RX REV CODE- 250/636: Performed by: UROLOGY

## 2019-09-24 PROCEDURE — 88344 IMHCHEM/IMCYTCHM EA MLT ANTB: CPT

## 2019-09-24 PROCEDURE — 74011250636 HC RX REV CODE- 250/636

## 2019-09-24 PROCEDURE — 77030020782 HC GWN BAIR PAWS FLX 3M -B: Performed by: UROLOGY

## 2019-09-24 PROCEDURE — 74011000250 HC RX REV CODE- 250

## 2019-09-24 PROCEDURE — 76210000024 HC REC RM PH II 2.5 TO 3 HR: Performed by: UROLOGY

## 2019-09-24 PROCEDURE — 77030013688 US GUIDE NDL PLC

## 2019-09-24 PROCEDURE — 76010000154 HC OR TIME FIRST 0.5 HR: Performed by: UROLOGY

## 2019-09-24 PROCEDURE — 82962 GLUCOSE BLOOD TEST: CPT

## 2019-09-24 PROCEDURE — 77030003481 HC NDL BIOP GUN BARD -B: Performed by: UROLOGY

## 2019-09-24 PROCEDURE — 77030040361 HC SLV COMPR DVT MDII -B: Performed by: UROLOGY

## 2019-09-24 PROCEDURE — G0416 PROSTATE BIOPSY, ANY MTHD: HCPCS

## 2019-09-24 PROCEDURE — 76060000031 HC ANESTHESIA FIRST 0.5 HR: Performed by: UROLOGY

## 2019-09-24 PROCEDURE — 77030018836 HC SOL IRR NACL ICUM -A: Performed by: UROLOGY

## 2019-09-24 RX ORDER — DIPHENHYDRAMINE HYDROCHLORIDE 50 MG/ML
12.5 INJECTION, SOLUTION INTRAMUSCULAR; INTRAVENOUS
Status: CANCELLED | OUTPATIENT
Start: 2019-09-24

## 2019-09-24 RX ORDER — SODIUM CHLORIDE, SODIUM LACTATE, POTASSIUM CHLORIDE, CALCIUM CHLORIDE 600; 310; 30; 20 MG/100ML; MG/100ML; MG/100ML; MG/100ML
125 INJECTION, SOLUTION INTRAVENOUS CONTINUOUS
Status: DISCONTINUED | OUTPATIENT
Start: 2019-09-24 | End: 2019-09-24 | Stop reason: HOSPADM

## 2019-09-24 RX ORDER — SODIUM CHLORIDE 0.9 % (FLUSH) 0.9 %
5-40 SYRINGE (ML) INJECTION AS NEEDED
Status: CANCELLED | OUTPATIENT
Start: 2019-09-24

## 2019-09-24 RX ORDER — ONDANSETRON 2 MG/ML
4 INJECTION INTRAMUSCULAR; INTRAVENOUS ONCE
Status: CANCELLED | OUTPATIENT
Start: 2019-09-24 | End: 2019-09-24

## 2019-09-24 RX ORDER — SODIUM CHLORIDE 0.9 % (FLUSH) 0.9 %
5-40 SYRINGE (ML) INJECTION EVERY 8 HOURS
Status: CANCELLED | OUTPATIENT
Start: 2019-09-24

## 2019-09-24 RX ORDER — DEXTROSE MONOHYDRATE 100 MG/ML
125-250 INJECTION, SOLUTION INTRAVENOUS AS NEEDED
Status: CANCELLED | OUTPATIENT
Start: 2019-09-24

## 2019-09-24 RX ORDER — MAGNESIUM SULFATE 100 %
4 CRYSTALS MISCELLANEOUS AS NEEDED
Status: CANCELLED | OUTPATIENT
Start: 2019-09-24

## 2019-09-24 RX ORDER — LIDOCAINE HYDROCHLORIDE 20 MG/ML
INJECTION, SOLUTION EPIDURAL; INFILTRATION; INTRACAUDAL; PERINEURAL AS NEEDED
Status: DISCONTINUED | OUTPATIENT
Start: 2019-09-24 | End: 2019-09-24 | Stop reason: HOSPADM

## 2019-09-24 RX ORDER — FENTANYL CITRATE 50 UG/ML
25 INJECTION, SOLUTION INTRAMUSCULAR; INTRAVENOUS
Status: CANCELLED | OUTPATIENT
Start: 2019-09-24

## 2019-09-24 RX ORDER — HYDROMORPHONE HYDROCHLORIDE 2 MG/ML
0.2 INJECTION, SOLUTION INTRAMUSCULAR; INTRAVENOUS; SUBCUTANEOUS AS NEEDED
Status: CANCELLED | OUTPATIENT
Start: 2019-09-24

## 2019-09-24 RX ORDER — NALOXONE HYDROCHLORIDE 0.4 MG/ML
0.1 INJECTION, SOLUTION INTRAMUSCULAR; INTRAVENOUS; SUBCUTANEOUS AS NEEDED
Status: CANCELLED | OUTPATIENT
Start: 2019-09-24

## 2019-09-24 RX ORDER — SODIUM CHLORIDE, SODIUM LACTATE, POTASSIUM CHLORIDE, CALCIUM CHLORIDE 600; 310; 30; 20 MG/100ML; MG/100ML; MG/100ML; MG/100ML
150 INJECTION, SOLUTION INTRAVENOUS CONTINUOUS
Status: CANCELLED | OUTPATIENT
Start: 2019-09-24

## 2019-09-24 RX ORDER — ONDANSETRON 2 MG/ML
INJECTION INTRAMUSCULAR; INTRAVENOUS AS NEEDED
Status: DISCONTINUED | OUTPATIENT
Start: 2019-09-24 | End: 2019-09-24 | Stop reason: HOSPADM

## 2019-09-24 RX ORDER — MIDAZOLAM HYDROCHLORIDE 1 MG/ML
INJECTION, SOLUTION INTRAMUSCULAR; INTRAVENOUS AS NEEDED
Status: DISCONTINUED | OUTPATIENT
Start: 2019-09-24 | End: 2019-09-24 | Stop reason: HOSPADM

## 2019-09-24 RX ORDER — HYDROCODONE BITARTRATE AND ACETAMINOPHEN 5; 325 MG/1; MG/1
1 TABLET ORAL AS NEEDED
Status: CANCELLED | OUTPATIENT
Start: 2019-09-24

## 2019-09-24 RX ORDER — CIPROFLOXACIN 500 MG/1
500 TABLET ORAL 2 TIMES DAILY
COMMUNITY
End: 2021-04-13

## 2019-09-24 RX ORDER — ALBUTEROL SULFATE 0.83 MG/ML
2.5 SOLUTION RESPIRATORY (INHALATION) AS NEEDED
Status: CANCELLED | OUTPATIENT
Start: 2019-09-24

## 2019-09-24 RX ORDER — CIPROFLOXACIN 2 MG/ML
400 INJECTION, SOLUTION INTRAVENOUS ONCE
Status: COMPLETED | OUTPATIENT
Start: 2019-09-24 | End: 2019-09-24

## 2019-09-24 RX ORDER — PROPOFOL 10 MG/ML
INJECTION, EMULSION INTRAVENOUS AS NEEDED
Status: DISCONTINUED | OUTPATIENT
Start: 2019-09-24 | End: 2019-09-24 | Stop reason: HOSPADM

## 2019-09-24 RX ADMIN — PROPOFOL 40 MG: 10 INJECTION, EMULSION INTRAVENOUS at 09:00

## 2019-09-24 RX ADMIN — SODIUM CHLORIDE, SODIUM LACTATE, POTASSIUM CHLORIDE, AND CALCIUM CHLORIDE 125 ML/HR: 600; 310; 30; 20 INJECTION, SOLUTION INTRAVENOUS at 08:10

## 2019-09-24 RX ADMIN — PROPOFOL 20 MG: 10 INJECTION, EMULSION INTRAVENOUS at 09:04

## 2019-09-24 RX ADMIN — PROPOFOL 20 MG: 10 INJECTION, EMULSION INTRAVENOUS at 09:07

## 2019-09-24 RX ADMIN — PROPOFOL 20 MG: 10 INJECTION, EMULSION INTRAVENOUS at 09:09

## 2019-09-24 RX ADMIN — MIDAZOLAM HYDROCHLORIDE 2 MG: 1 INJECTION, SOLUTION INTRAMUSCULAR; INTRAVENOUS at 08:54

## 2019-09-24 RX ADMIN — LIDOCAINE HYDROCHLORIDE 50 MG: 20 INJECTION, SOLUTION EPIDURAL; INFILTRATION; INTRACAUDAL; PERINEURAL at 09:00

## 2019-09-24 RX ADMIN — ONDANSETRON 4 MG: 2 INJECTION INTRAMUSCULAR; INTRAVENOUS at 09:00

## 2019-09-24 RX ADMIN — CIPROFLOXACIN 400 MG: 2 INJECTION, SOLUTION INTRAVENOUS at 08:54

## 2019-09-24 RX ADMIN — PROPOFOL 20 MG: 10 INJECTION, EMULSION INTRAVENOUS at 09:02

## 2019-09-24 NOTE — INTERVAL H&P NOTE
H&P Update:  Lanette De La Garza was seen and examined. History and physical has been reviewed. The patient has been examined.  There have been no significant clinical changes since the completion of the originally dated History and Physical.

## 2019-09-24 NOTE — ANESTHESIA PREPROCEDURE EVALUATION
Relevant Problems   No relevant active problems       Anesthetic History   No history of anesthetic complications            Review of Systems / Medical History  Patient summary reviewed, nursing notes reviewed and pertinent labs reviewed    Pulmonary  Within defined limits                 Neuro/Psych   Within defined limits           Cardiovascular    Hypertension              Exercise tolerance: >4 METS     GI/Hepatic/Renal     GERD           Endo/Other        Arthritis     Other Findings              Physical Exam    Airway  Mallampati: II  TM Distance: 4 - 6 cm  Neck ROM: normal range of motion   Mouth opening: Normal     Cardiovascular  Regular rate and rhythm,  S1 and S2 normal,  no murmur, click, rub, or gallop             Dental  No notable dental hx       Pulmonary  Breath sounds clear to auscultation               Abdominal  GI exam deferred       Other Findings            Anesthetic Plan    ASA: 2  Anesthesia type: MAC          Induction: Intravenous  Anesthetic plan and risks discussed with: Patient

## 2019-09-24 NOTE — OP NOTES
PREOPERATIVE DIAGNOSIS:  Elevated prostate-specific antigen.     POSTOPERATIVE DIAGNOSIS:  Elevated prostate-specific antigen.     PROCEDURES PERFORMED:  Transrectal ultrasound, ultrasound needle guidance,  and prostate biopsy.     SURGEON:  Asia Rojas M.D.     ASSISTANT:  None     ANESTHESIA:  MAC     ESTIMATED BLOOD LOSS:  Minimal.     SPECIMENS REMOVED:  Right apex, right base, right mid, right transition zone, left apex, left  Base, left mid, and left transition zone of prostate.     FINDINGS:  Very heterogeneous prostate.  There was a 57.54 mL in size prostate.     COMPLICATIONS:  None.     IMPLANTS:  None.     CLINICAL NOTE:  The patient is a very pleasant 69year-old gentleman with an elevated PSA.        DESCRIPTION OF PROCEDURE:  Preoperatively, the risks and benefits of  surgery were described to the patient.  The risks include, but are not  limited to, bleeding, infection, and possible need for a future procedure. The patient assumed the risks and signed the informed consent.  The patient  was taken to the operating room and placed on the OR table in supine  position.  He was administered a MAC anesthetic.  He was administered  intravenous antibiotics.  He was placed in dorsolithotomy position.  A  transrectal ultrasound probe was then inserted transanally without  difficulty into the rectum.  Ultrasound pictures were taken of the prostate  in transverse and longitudinal views.  The prostate was measured using the  standard Eliptoid formula and was found to be 57.54 mL in size.  The  ultrasound views were obtained of the seminal vesicles, which were normal;  the transition zone, which was normal; the right periprosthetic tissue,  which was normal; and the left periprosthetic tissue, which was normal.  The left apex, mid and base, showed no hypoechoic lesions or areas of  abnormality.  At this point, using ultrasound guidance, biopsies were taken. At the left base, 4 biopsies were taken.  At the left mid 4 biopsies were taken. At the left apex 4 were taken.  At the left transition zone, 2 biopsies were taken. Then, 4 biopsies were taken at the right base, 4 biopsies were taken at the right mid, and 4 biopsies  taken at the right apex. Finally 2 biopsies were taken at the right transition zone.    At this point, the ultrasound probe was removed. Rectal pressure was applied and a packing was placed.   There was no significant bleeding.  The patient was taken out of lithotomy  position, revived from anesthesia, and taken to recovery in stable condition.  Calin Francis MD

## 2019-09-24 NOTE — ANESTHESIA POSTPROCEDURE EVALUATION
Procedure(s):  TRANSRECTAL ULTRASOUND GUIDED SATURATION  PROSTATE BIOPSY. MAC    Anesthesia Post Evaluation      Multimodal analgesia: multimodal analgesia used between 6 hours prior to anesthesia start to PACU discharge  Patient location during evaluation: PACU  Patient participation: complete - patient participated  Level of consciousness: awake  Pain management: adequate  Airway patency: patent  Anesthetic complications: no  Respiratory status: acceptable  Hydration status: acceptable  Post anesthesia nausea and vomiting:  none      Vitals Value Taken Time   BP 90/63 9/24/2019  9:45 AM   Temp     Pulse 62 9/24/2019  9:56 AM   Resp     SpO2 100 % 9/24/2019  9:56 AM   Vitals shown include unvalidated device data.

## 2019-09-24 NOTE — DISCHARGE INSTRUCTIONS
Patient Education        Prostate Biopsy: About This Test  What is it? A prostate biopsy is a type of test. Your doctor takes small tissue samples from your prostate gland. Then another doctor looks at the tissue under a microscope to see if there are cancer cells. This test is done by a doctor who specializes in men's genital and urinary problems (urologist). It can be done in your doctor's office, a day surgery clinic, or a hospital operating room. To get the tissue samples from the prostate, the doctor inserts a thin needle through the rectum, the urethra, or the area between the anus and scrotum (perineum). The most common method is through the rectum. Your doctor may use ultrasound to help guide the needle. Why is this test done? You may need a prostate biopsy if your doctor found something of concern during a digital rectal exam. Or you may need it if a blood test showed a high level of prostate-specific antigen (PSA). A biopsy can help find out if you have prostate cancer. How can you prepare for this test?  Before you have a prostate biopsy, tell your doctor if you are taking any medicines or using any herbal supplements, or if you are allergic to any medicines. And tell your doctor if you have had bleeding problems, or you take aspirin or some other blood thinner. What happens before the test?  · You may need to have an enema before the test.  · You will need to take off all or most of your clothes. You will be given a cloth or paper gown to use during the test.  · You may be given a sedative through a vein (IV) in your arm. The sedative will help you relax and stay still. What happens during the test?  Some men have an MRI of the prostate before their biopsy. This helps to find the areas in the prostate to take biopsy samples. If you have an MRI, your doctor will use ultrasound and the MRI results to find the areas to biopsy.   Through the rectum  · You may be asked to kneel, lie on your side, or lie on your back. · Your doctor may inject an anesthetic around the prostate to numb the area before the sample is taken. · Ultrasound is often used to guide the needle to the correct spot. · Your doctor may choose to use a needle guide for the biopsy. He or she will attach the guide to a finger. Your doctor will insert the finger into your rectum. · The needle will enter the prostate and take 6 to 12 samples. · The needle collects samples of tissue and is then pulled out. What else should you know about this test?  · A prostate biopsy has a slight risk of causing problems such as infection or bleeding. · If the biopsy went through your rectum, you may have a small amount of bleeding from your rectum for 2 to 3 days after the biopsy. · You may have a little pain in your pelvic area. You may also have a little blood in your urine for 1 to 5 days. · You may have some blood in your semen for a week or longer. How long will the test take? This test will take about 15 to 45 minutes. What happens after the test?  Your doctor will tell you what to expect and watch for after your test. In general:  · If you have anesthesia that makes you sleep, you will be in a recovery room for a few hours. You will need someone to drive you home. You may feel tired for the rest of the day. · You will probably be able to go home right away. · If your doctor had you stop taking any medicine for the biopsy, ask him or her when you can start taking it again. If you were given antibiotics, take them as directed. · Do not do heavy work or exercise for 4 hours after the test.  · Your doctor will tell you how long it may take to get your results back. Follow-up care is a key part of your treatment and safety. Be sure to make and go to all appointments, and call your doctor if you are having problems. It's also a good idea to keep a list of the medicines you take.  Ask your doctor when you can expect to have your test results. Where can you learn more? Go to http://kassandra-kayli.info/. Enter Y504 in the search box to learn more about \"Prostate Biopsy: About This Test.\"  Current as of: December 19, 2018  Content Version: 12.2  © 4285-7141 [employer]. Care instructions adapted under license by [employer] (which disclaims liability or warranty for this information). If you have questions about a medical condition or this instruction, always ask your healthcare professional. [address] any warranty or liability for your use of this information. Avoid straining  Avoid constipation  Avoid straining, avoid heavy lifting  Resume pre hospital diet  Drink plenty of fluids  Ambulate in house  Shower tomorrow  Dr. Funmilayo [place] office will call with follow up appointment        DISCHARGE SUMMARY from Nurse    PATIENT INSTRUCTIONS:    After general anesthesia or intravenous sedation, for 24 hours or while taking prescription Narcotics:  · Limit your activities  · Do not drive and operate hazardous machinery  · Do not make important personal or business decisions  · Do  not drink alcoholic beverages  · If you have not urinated within 8 hours after discharge, please contact your surgeon on call. Report the following to your surgeon:  · Excessive pain, swelling, redness or odor of or around the surgical area  · Temperature over 100.5  · Nausea and vomiting lasting longer than 4 hours or if unable to take medications  · Any signs of decreased circulation or nerve impairment to extremity: change in color, persistent  numbness, tingling, coldness or increase pain  · Any questions    What to do at Home:  Recommended activity: Ambulate in house,     If you experience any of the following symptoms fever, chills, uncontrollable pain , active bleeding, nausea, vomiting, please follow up with Dr. Fina Holley.     *  Please give a list of your current medications to your Primary Care Provider. *  Please update this list whenever your medications are discontinued, doses are      changed, or new medications (including over-the-counter products) are added. *  Please carry medication information at all times in case of emergency situations. These are general instructions for a healthy lifestyle:    No smoking/ No tobacco products/ Avoid exposure to second hand smoke  Surgeon General's Warning:  Quitting smoking now greatly reduces serious risk to your health. Obesity, smoking, and sedentary lifestyle greatly increases your risk for illness    A healthy diet, regular physical exercise & weight monitoring are important for maintaining a healthy lifestyle    You may be retaining fluid if you have a history of heart failure or if you experience any of the following symptoms:  Weight gain of 3 pounds or more overnight or 5 pounds in a week, increased swelling in our hands or feet or shortness of breath while lying flat in bed. Please call your doctor as soon as you notice any of these symptoms; do not wait until your next office visit. Patient armband removed and shredded  The discharge information has been reviewed with the patient and caregiver. The patient and caregiver verbalized understanding. Discharge medications reviewed with the patient and caregiver and appropriate educational materials and side effects teaching were provided.   ___________________________________________________________________________________________________________________________________

## 2019-09-24 NOTE — PERIOP NOTES
In accordance with the surgeon notification escalation protocol Jose Raul Caldwell RN was contacted and informed that the patient last had his 81 mg Aspirin and 800 mg Ibuprofen on 9/19/19. Jaclyn Varela states that she will inform  and call back with any questions or concerns.

## 2019-09-24 NOTE — H&P
PREOPERATIVE DIAGNOSIS:  Elevated prostate-specific antigen. POSTOPERATIVE DIAGNOSIS:  Elevated prostate-specific antigen. PROCEDURES PERFORMED:  Transrectal ultrasound, ultrasound needle guidance,  and prostate biopsy. SURGEON:  JIGAR Troy:  None      ANESTHESIA:  MAC      ESTIMATED BLOOD LOSS:  Minimal.      SPECIMENS REMOVED:  Right apex, right base, right mid, right transition zone, left apex, left  Base, left mid, and left transition zone of prostate. FINDINGS:  Very heterogeneous prostate. There was a 57.54 mL in size prostate. COMPLICATIONS:  None. IMPLANTS:  None. CLINICAL NOTE:  The patient is a very pleasant 69year-old gentleman with an elevated PSA. DESCRIPTION OF PROCEDURE:  Preoperatively, the risks and benefits of  surgery were described to the patient. The risks include, but are not  limited to, bleeding, infection, and possible need for a future procedure. The patient assumed the risks and signed the informed consent. The patient  was taken to the operating room and placed on the OR table in supine  position. He was administered a MAC anesthetic. He was administered  intravenous antibiotics. He was placed in dorsolithotomy position. A  transrectal ultrasound probe was then inserted transanally without  difficulty into the rectum. Ultrasound pictures were taken of the prostate  in transverse and longitudinal views. The prostate was measured using the  standard Eliptoid formula and was found to be 57.54 mL in size. The  ultrasound views were obtained of the seminal vesicles, which were normal;  the transition zone, which was normal; the right periprosthetic tissue,  which was normal; and the left periprosthetic tissue, which was normal.  The left apex, mid and base, showed no hypoechoic lesions or areas of  abnormality. At this point, using ultrasound guidance, biopsies were taken. At the left base, 4 biopsies were taken.  At the left mid 4 biopsies were taken. At the left apex 4 were taken. At the left transition zone, 2 biopsies were taken. Then, 4 biopsies were taken at the right base, 4 biopsies were taken at the right mid, and 4 biopsies  taken at the right apex. Finally 2 biopsies were taken at the right transition zone. At this point, the ultrasound probe was removed. Rectal pressure was applied and a packing was placed. There was no significant bleeding. The patient was taken out of lithotomy  position, revived from anesthesia, and taken to recovery in stable condition.       Merritt Hess MD

## 2019-09-24 NOTE — PERIOP NOTES
Reviewed PTA medication list with patient/caregiver and patient/caregiver denies any additional medications. Patient admits to having a responsible adult care for them at home for at least 24 hours after surgery. Patient denies nhung chewing/swallowing difficulties. Patient encouraged to use Andrzej paws warming system and informed that using Andrzej paws to regulate body temperature prior to a procedure has been shown to help reduce the risks of blood clots and infection. Dual skin assessment & fall risk band verification completed with Jli Boyle RN.

## 2021-04-05 ENCOUNTER — HOSPITAL ENCOUNTER (OUTPATIENT)
Dept: PREADMISSION TESTING | Age: 76
Discharge: HOME | End: 2021-04-05
Payer: MEDICARE

## 2021-04-05 ENCOUNTER — TRANSCRIBE ORDER (OUTPATIENT)
Dept: REGISTRATION | Age: 76
End: 2021-04-05

## 2021-04-05 DIAGNOSIS — E87.6 HYPOPOTASSEMIA: Primary | ICD-10-CM

## 2021-04-05 DIAGNOSIS — E87.6 HYPOPOTASSEMIA: ICD-10-CM

## 2021-04-05 LAB
ANION GAP SERPL CALC-SCNC: 4 MMOL/L (ref 3–18)
BUN SERPL-MCNC: 14 MG/DL (ref 7–18)
BUN/CREAT SERPL: 14 (ref 12–20)
CALCIUM SERPL-MCNC: 10.7 MG/DL (ref 8.5–10.1)
CHLORIDE SERPL-SCNC: 109 MMOL/L (ref 100–111)
CO2 SERPL-SCNC: 31 MMOL/L (ref 21–32)
CREAT SERPL-MCNC: 1.02 MG/DL (ref 0.6–1.3)
GLUCOSE SERPL-MCNC: 106 MG/DL (ref 74–99)
POTASSIUM SERPL-SCNC: 3.8 MMOL/L (ref 3.5–5.5)
SODIUM SERPL-SCNC: 144 MMOL/L (ref 136–145)

## 2021-04-05 PROCEDURE — 93005 ELECTROCARDIOGRAM TRACING: CPT

## 2021-04-05 PROCEDURE — 36415 COLL VENOUS BLD VENIPUNCTURE: CPT

## 2021-04-05 PROCEDURE — 80048 BASIC METABOLIC PNL TOTAL CA: CPT

## 2021-04-06 ENCOUNTER — HOSPITAL ENCOUNTER (OUTPATIENT)
Dept: PREADMISSION TESTING | Age: 76
Discharge: HOME | End: 2021-04-06
Payer: MEDICARE

## 2021-04-06 ENCOUNTER — HOSPITAL ENCOUNTER (OUTPATIENT)
Dept: GENERAL RADIOLOGY | Age: 76
Discharge: HOME | End: 2021-04-06
Payer: MEDICARE

## 2021-04-06 ENCOUNTER — TRANSCRIBE ORDER (OUTPATIENT)
Dept: REGISTRATION | Age: 76
End: 2021-04-06

## 2021-04-06 DIAGNOSIS — M75.101 ROTATOR CUFF SYNDROME OF RIGHT SHOULDER: ICD-10-CM

## 2021-04-06 DIAGNOSIS — M75.101 ROTATOR CUFF SYNDROME OF RIGHT SHOULDER: Primary | ICD-10-CM

## 2021-04-06 LAB
ALBUMIN SERPL-MCNC: 4.2 G/DL (ref 3.4–5)
ALBUMIN/GLOB SERPL: 1.4 {RATIO} (ref 0.8–1.7)
ALP SERPL-CCNC: 64 U/L (ref 45–117)
ALT SERPL-CCNC: 26 U/L (ref 16–61)
APPEARANCE UR: CLEAR
APTT PPP: 28.9 SEC (ref 23–36.4)
AST SERPL-CCNC: 18 U/L (ref 10–38)
ATRIAL RATE: 51 BPM
BASOPHILS # BLD: 0 K/UL (ref 0–0.1)
BASOPHILS NFR BLD: 1 % (ref 0–2)
BILIRUB DIRECT SERPL-MCNC: 0.2 MG/DL (ref 0–0.2)
BILIRUB SERPL-MCNC: 0.5 MG/DL (ref 0.2–1)
BILIRUB UR QL: NEGATIVE
CALCULATED P AXIS, ECG09: 60 DEGREES
CALCULATED R AXIS, ECG10: 7 DEGREES
CALCULATED T AXIS, ECG11: 31 DEGREES
COLOR UR: YELLOW
DIAGNOSIS, 93000: NORMAL
DIFFERENTIAL METHOD BLD: ABNORMAL
EOSINOPHIL # BLD: 0.1 K/UL (ref 0–0.4)
EOSINOPHIL NFR BLD: 2 % (ref 0–5)
ERYTHROCYTE [DISTWIDTH] IN BLOOD BY AUTOMATED COUNT: 13.3 % (ref 11.6–14.5)
GLOBULIN SER CALC-MCNC: 2.9 G/DL (ref 2–4)
GLUCOSE UR STRIP.AUTO-MCNC: NEGATIVE MG/DL
HCT VFR BLD AUTO: 43.5 % (ref 36–48)
HGB BLD-MCNC: 14.7 G/DL (ref 13–16)
HGB UR QL STRIP: NEGATIVE
INR PPP: 1 (ref 0.8–1.2)
KETONES UR QL STRIP.AUTO: NEGATIVE MG/DL
LEUKOCYTE ESTERASE UR QL STRIP.AUTO: NEGATIVE
LYMPHOCYTES # BLD: 1.2 K/UL (ref 0.9–3.6)
LYMPHOCYTES NFR BLD: 24 % (ref 21–52)
MCH RBC QN AUTO: 29.6 PG (ref 24–34)
MCHC RBC AUTO-ENTMCNC: 33.8 G/DL (ref 31–37)
MCV RBC AUTO: 87.5 FL (ref 74–97)
MONOCYTES # BLD: 0.4 K/UL (ref 0.05–1.2)
MONOCYTES NFR BLD: 9 % (ref 3–10)
NEUTS SEG # BLD: 3.2 K/UL (ref 1.8–8)
NEUTS SEG NFR BLD: 64 % (ref 40–73)
NITRITE UR QL STRIP.AUTO: NEGATIVE
P-R INTERVAL, ECG05: 192 MS
PH UR STRIP: 6.5 [PH] (ref 5–8)
PLATELET # BLD AUTO: 227 K/UL (ref 135–420)
PMV BLD AUTO: 8.9 FL (ref 9.2–11.8)
PROT SERPL-MCNC: 7.1 G/DL (ref 6.4–8.2)
PROT UR STRIP-MCNC: NEGATIVE MG/DL
PROTHROMBIN TIME: 13.3 SEC (ref 11.5–15.2)
Q-T INTERVAL, ECG07: 418 MS
QRS DURATION, ECG06: 112 MS
QTC CALCULATION (BEZET), ECG08: 385 MS
RBC # BLD AUTO: 4.97 M/UL (ref 4.35–5.65)
SP GR UR REFRACTOMETRY: 1.01 (ref 1–1.03)
UROBILINOGEN UR QL STRIP.AUTO: 0.2 EU/DL (ref 0.2–1)
VENTRICULAR RATE, ECG03: 51 BPM
WBC # BLD AUTO: 4.9 K/UL (ref 4.6–13.2)

## 2021-04-06 PROCEDURE — 85610 PROTHROMBIN TIME: CPT

## 2021-04-06 PROCEDURE — 81003 URINALYSIS AUTO W/O SCOPE: CPT

## 2021-04-06 PROCEDURE — 85025 COMPLETE CBC W/AUTO DIFF WBC: CPT

## 2021-04-06 PROCEDURE — 85730 THROMBOPLASTIN TIME PARTIAL: CPT

## 2021-04-06 PROCEDURE — 71045 X-RAY EXAM CHEST 1 VIEW: CPT

## 2021-04-06 PROCEDURE — 80076 HEPATIC FUNCTION PANEL: CPT

## 2021-04-06 PROCEDURE — 87086 URINE CULTURE/COLONY COUNT: CPT

## 2021-04-06 PROCEDURE — 36415 COLL VENOUS BLD VENIPUNCTURE: CPT

## 2021-04-07 LAB
BACTERIA SPEC CULT: NORMAL
CC UR VC: NORMAL
SERVICE CMNT-IMP: NORMAL
SERVICE CMNT-IMP: NORMAL

## 2021-04-12 NOTE — NURSE NAVIGATOR
Online link to online seminar and preoperative education book sent ink sent to patient via e-mail provided by the patient.

## 2021-04-12 NOTE — NURSE NAVIGATOR
Angeles Arrington watched the joint seminar online and received a preoperative education booklet in anticipation of joint replacement surgery.      Orthopaedic Nurse Navigator

## 2021-04-14 ENCOUNTER — TRANSCRIBE ORDER (OUTPATIENT)
Dept: REGISTRATION | Age: 76
End: 2021-04-14

## 2021-04-14 ENCOUNTER — HOSPITAL ENCOUNTER (OUTPATIENT)
Dept: PREADMISSION TESTING | Age: 76
Discharge: HOME | End: 2021-04-14
Payer: MEDICARE

## 2021-04-14 DIAGNOSIS — M75.101 ROTATOR CUFF SYNDROME OF RIGHT SHOULDER: Primary | ICD-10-CM

## 2021-04-14 LAB
ABO + RH BLD: NORMAL
BLOOD GROUP ANTIBODIES SERPL: NORMAL
SPECIMEN EXP DATE BLD: NORMAL

## 2021-04-14 PROCEDURE — 86901 BLOOD TYPING SEROLOGIC RH(D): CPT

## 2021-04-14 PROCEDURE — 36415 COLL VENOUS BLD VENIPUNCTURE: CPT

## 2021-04-15 ENCOUNTER — HOSPITAL ENCOUNTER (OUTPATIENT)
Dept: PREADMISSION TESTING | Age: 76
Discharge: HOME | End: 2021-04-15
Payer: MEDICARE

## 2021-04-15 PROCEDURE — U0003 INFECTIOUS AGENT DETECTION BY NUCLEIC ACID (DNA OR RNA); SEVERE ACUTE RESPIRATORY SYNDROME CORONAVIRUS 2 (SARS-COV-2) (CORONAVIRUS DISEASE [COVID-19]), AMPLIFIED PROBE TECHNIQUE, MAKING USE OF HIGH THROUGHPUT TECHNOLOGIES AS DESCRIBED BY CMS-2020-01-R: HCPCS

## 2021-04-16 LAB — SARS-COV-2, COV2NT: NOT DETECTED

## 2021-04-20 ENCOUNTER — ANESTHESIA EVENT (OUTPATIENT)
Dept: SURGERY | Age: 76
DRG: 483 | End: 2021-04-20
Payer: MEDICARE

## 2021-04-20 RX ORDER — SODIUM CHLORIDE 0.9 % (FLUSH) 0.9 %
5-40 SYRINGE (ML) INJECTION AS NEEDED
Status: CANCELLED | OUTPATIENT
Start: 2021-04-20

## 2021-04-20 RX ORDER — SODIUM CHLORIDE 0.9 % (FLUSH) 0.9 %
5-40 SYRINGE (ML) INJECTION EVERY 8 HOURS
Status: CANCELLED | OUTPATIENT
Start: 2021-04-20

## 2021-04-21 ENCOUNTER — ANESTHESIA (OUTPATIENT)
Dept: SURGERY | Age: 76
DRG: 483 | End: 2021-04-21
Payer: MEDICARE

## 2021-04-21 ENCOUNTER — HOSPITAL ENCOUNTER (INPATIENT)
Age: 76
LOS: 1 days | Discharge: HOME HEALTH | DRG: 483 | End: 2021-04-22
Attending: ORTHOPAEDIC SURGERY | Admitting: ORTHOPAEDIC SURGERY
Payer: MEDICARE

## 2021-04-21 ENCOUNTER — APPOINTMENT (OUTPATIENT)
Dept: GENERAL RADIOLOGY | Age: 76
DRG: 483 | End: 2021-04-21
Attending: PHYSICIAN ASSISTANT
Payer: MEDICARE

## 2021-04-21 DIAGNOSIS — Z96.611 HISTORY OF RIGHT SHOULDER REPLACEMENT: Primary | ICD-10-CM

## 2021-04-21 PROCEDURE — 0RRJ00Z REPLACEMENT OF RIGHT SHOULDER JOINT WITH REVERSE BALL AND SOCKET SYNTHETIC SUBSTITUTE, OPEN APPROACH: ICD-10-PCS | Performed by: ORTHOPAEDIC SURGERY

## 2021-04-21 PROCEDURE — 77030018673: Performed by: ORTHOPAEDIC SURGERY

## 2021-04-21 PROCEDURE — 65270000029 HC RM PRIVATE

## 2021-04-21 PROCEDURE — 64447 NJX AA&/STRD FEMORAL NRV IMG: CPT | Performed by: ORTHOPAEDIC SURGERY

## 2021-04-21 PROCEDURE — 76060000035 HC ANESTHESIA 2 TO 2.5 HR: Performed by: ORTHOPAEDIC SURGERY

## 2021-04-21 PROCEDURE — 77030042509 HC BIT DRL -C: Performed by: ORTHOPAEDIC SURGERY

## 2021-04-21 PROCEDURE — 74011000250 HC RX REV CODE- 250: Performed by: ORTHOPAEDIC SURGERY

## 2021-04-21 PROCEDURE — 76210000063 HC OR PH I REC FIRST 0.5 HR: Performed by: ORTHOPAEDIC SURGERY

## 2021-04-21 PROCEDURE — 77030040361 HC SLV COMPR DVT MDII -B: Performed by: ORTHOPAEDIC SURGERY

## 2021-04-21 PROCEDURE — 74011250636 HC RX REV CODE- 250/636: Performed by: ORTHOPAEDIC SURGERY

## 2021-04-21 PROCEDURE — 77030008477 HC STYL SATN SLP COVD -A: Performed by: ANESTHESIOLOGY

## 2021-04-21 PROCEDURE — C1776 JOINT DEVICE (IMPLANTABLE): HCPCS | Performed by: ORTHOPAEDIC SURGERY

## 2021-04-21 PROCEDURE — 76010000131 HC OR TIME 2 TO 2.5 HR: Performed by: ORTHOPAEDIC SURGERY

## 2021-04-21 PROCEDURE — 77030008683 HC TU ET CUF COVD -A: Performed by: ANESTHESIOLOGY

## 2021-04-21 PROCEDURE — 77030002986 HC SUT PROL J&J -A: Performed by: ORTHOPAEDIC SURGERY

## 2021-04-21 PROCEDURE — 74011250636 HC RX REV CODE- 250/636: Performed by: NURSE ANESTHETIST, CERTIFIED REGISTERED

## 2021-04-21 PROCEDURE — 76942 ECHO GUIDE FOR BIOPSY: CPT | Performed by: ORTHOPAEDIC SURGERY

## 2021-04-21 PROCEDURE — 74011250637 HC RX REV CODE- 250/637: Performed by: ORTHOPAEDIC SURGERY

## 2021-04-21 PROCEDURE — 74011000258 HC RX REV CODE- 258: Performed by: ORTHOPAEDIC SURGERY

## 2021-04-21 PROCEDURE — 2709999900 HC NON-CHARGEABLE SUPPLY: Performed by: ORTHOPAEDIC SURGERY

## 2021-04-21 PROCEDURE — 74011000250 HC RX REV CODE- 250: Performed by: NURSE ANESTHETIST, CERTIFIED REGISTERED

## 2021-04-21 PROCEDURE — C9290 INJ, BUPIVACAINE LIPOSOME: HCPCS | Performed by: ORTHOPAEDIC SURGERY

## 2021-04-21 PROCEDURE — 77030027138 HC INCENT SPIROMETER -A: Performed by: ORTHOPAEDIC SURGERY

## 2021-04-21 PROCEDURE — 77030020782 HC GWN BAIR PAWS FLX 3M -B: Performed by: ORTHOPAEDIC SURGERY

## 2021-04-21 PROCEDURE — 0LS30ZZ REPOSITION RIGHT UPPER ARM TENDON, OPEN APPROACH: ICD-10-PCS | Performed by: ORTHOPAEDIC SURGERY

## 2021-04-21 PROCEDURE — 73020 X-RAY EXAM OF SHOULDER: CPT

## 2021-04-21 PROCEDURE — 77030013708 HC HNDPC SUC IRR PULS STRY –B: Performed by: ORTHOPAEDIC SURGERY

## 2021-04-21 PROCEDURE — 77030002933 HC SUT MCRYL J&J -A: Performed by: ORTHOPAEDIC SURGERY

## 2021-04-21 PROCEDURE — 77030031139 HC SUT VCRL2 J&J -A: Performed by: ORTHOPAEDIC SURGERY

## 2021-04-21 PROCEDURE — 77030006835 HC BLD SAW SAG STRY -B: Performed by: ORTHOPAEDIC SURGERY

## 2021-04-21 PROCEDURE — 77030038662 HC GD PIN TRIL -C: Performed by: ORTHOPAEDIC SURGERY

## 2021-04-21 PROCEDURE — 74011250636 HC RX REV CODE- 250/636: Performed by: PHYSICIAN ASSISTANT

## 2021-04-21 PROCEDURE — 74011250636 HC RX REV CODE- 250/636: Performed by: ANESTHESIOLOGY

## 2021-04-21 PROCEDURE — 77030006643: Performed by: ANESTHESIOLOGY

## 2021-04-21 PROCEDURE — 74011250637 HC RX REV CODE- 250/637: Performed by: PHYSICIAN ASSISTANT

## 2021-04-21 DEVICE — SCREW BONE L14MM DIA5MM TI ST FULL THRD PERIPH FOR GLEN: Type: IMPLANTABLE DEVICE | Site: SHOULDER | Status: FUNCTIONAL

## 2021-04-21 DEVICE — TRAY HUM THK+0MM 15MM OFFSET SHLDR LO REVERSED AEQUALIS: Type: IMPLANTABLE DEVICE | Site: SHOULDER | Status: FUNCTIONAL

## 2021-04-21 DEVICE — INSERT SHLDR C DIA39MM THK+6MM 7.5DEG REVERSED AEQUALIS: Type: IMPLANTABLE DEVICE | Site: SHOULDER | Status: FUNCTIONAL

## 2021-04-21 DEVICE — SPHERE GLEN DIA39MM STD REVERSED AEQUALIS PERFORMA: Type: IMPLANTABLE DEVICE | Site: SHOULDER | Status: FUNCTIONAL

## 2021-04-21 DEVICE — SCREW BONE L30MM DIA5MM TI ST FULL THRD PERIPH FOR GLEN: Type: IMPLANTABLE DEVICE | Site: SHOULDER | Status: FUNCTIONAL

## 2021-04-21 DEVICE — SCREW BONE L22MM DIA5MM TI ST FULL THRD PERIPH FOR GLEN: Type: IMPLANTABLE DEVICE | Site: SHOULDER | Status: FUNCTIONAL

## 2021-04-21 DEVICE — SCREW BONE L35MM DIA6.5MM TI ST FULL THRD CTRL FOR GLEN: Type: IMPLANTABLE DEVICE | Site: SHOULDER | Status: FUNCTIONAL

## 2021-04-21 DEVICE — IMPLANTABLE DEVICE: Type: IMPLANTABLE DEVICE | Site: SHOULDER | Status: FUNCTIONAL

## 2021-04-21 RX ORDER — TAMSULOSIN HYDROCHLORIDE 0.4 MG/1
0.4 CAPSULE ORAL DAILY
Status: DISCONTINUED | OUTPATIENT
Start: 2021-04-22 | End: 2021-04-22 | Stop reason: HOSPADM

## 2021-04-21 RX ORDER — SODIUM CHLORIDE 0.9 % (FLUSH) 0.9 %
5-40 SYRINGE (ML) INJECTION EVERY 8 HOURS
Status: DISCONTINUED | OUTPATIENT
Start: 2021-04-21 | End: 2021-04-22 | Stop reason: HOSPADM

## 2021-04-21 RX ORDER — NALOXONE HYDROCHLORIDE 0.4 MG/ML
0.4 INJECTION, SOLUTION INTRAMUSCULAR; INTRAVENOUS; SUBCUTANEOUS AS NEEDED
Status: DISCONTINUED | OUTPATIENT
Start: 2021-04-21 | End: 2021-04-22 | Stop reason: HOSPADM

## 2021-04-21 RX ORDER — OXYCODONE HYDROCHLORIDE 5 MG/1
10 TABLET ORAL
Status: DISCONTINUED | OUTPATIENT
Start: 2021-04-21 | End: 2021-04-22 | Stop reason: HOSPADM

## 2021-04-21 RX ORDER — PROPOFOL 10 MG/ML
INJECTION, EMULSION INTRAVENOUS AS NEEDED
Status: DISCONTINUED | OUTPATIENT
Start: 2021-04-21 | End: 2021-04-21 | Stop reason: HOSPADM

## 2021-04-21 RX ORDER — SODIUM CHLORIDE 0.9 % (FLUSH) 0.9 %
5-40 SYRINGE (ML) INJECTION EVERY 8 HOURS
Status: DISCONTINUED | OUTPATIENT
Start: 2021-04-21 | End: 2021-04-21 | Stop reason: HOSPADM

## 2021-04-21 RX ORDER — SODIUM CHLORIDE, SODIUM LACTATE, POTASSIUM CHLORIDE, CALCIUM CHLORIDE 600; 310; 30; 20 MG/100ML; MG/100ML; MG/100ML; MG/100ML
125 INJECTION, SOLUTION INTRAVENOUS CONTINUOUS
Status: DISCONTINUED | OUTPATIENT
Start: 2021-04-21 | End: 2021-04-22 | Stop reason: HOSPADM

## 2021-04-21 RX ORDER — LOSARTAN POTASSIUM 50 MG/1
100 TABLET ORAL DAILY
Status: DISCONTINUED | OUTPATIENT
Start: 2021-04-22 | End: 2021-04-22 | Stop reason: HOSPADM

## 2021-04-21 RX ORDER — POLYETHYLENE GLYCOL 3350 17 G/17G
17 POWDER, FOR SOLUTION ORAL DAILY
Status: DISCONTINUED | OUTPATIENT
Start: 2021-04-22 | End: 2021-04-22 | Stop reason: HOSPADM

## 2021-04-21 RX ORDER — SODIUM CHLORIDE 0.9 % (FLUSH) 0.9 %
5-40 SYRINGE (ML) INJECTION AS NEEDED
Status: DISCONTINUED | OUTPATIENT
Start: 2021-04-21 | End: 2021-04-22 | Stop reason: HOSPADM

## 2021-04-21 RX ORDER — ROPIVACAINE HYDROCHLORIDE 5 MG/ML
INJECTION, SOLUTION EPIDURAL; INFILTRATION; PERINEURAL
Status: COMPLETED | OUTPATIENT
Start: 2021-04-21 | End: 2021-04-21

## 2021-04-21 RX ORDER — SODIUM CHLORIDE 0.9 % (FLUSH) 0.9 %
5-40 SYRINGE (ML) INJECTION AS NEEDED
Status: DISCONTINUED | OUTPATIENT
Start: 2021-04-21 | End: 2021-04-21 | Stop reason: HOSPADM

## 2021-04-21 RX ORDER — LORAZEPAM 0.5 MG/1
0.5 TABLET ORAL
Status: DISCONTINUED | OUTPATIENT
Start: 2021-04-21 | End: 2021-04-22 | Stop reason: HOSPADM

## 2021-04-21 RX ORDER — SODIUM CHLORIDE, SODIUM LACTATE, POTASSIUM CHLORIDE, CALCIUM CHLORIDE 600; 310; 30; 20 MG/100ML; MG/100ML; MG/100ML; MG/100ML
100 INJECTION, SOLUTION INTRAVENOUS CONTINUOUS
Status: DISCONTINUED | OUTPATIENT
Start: 2021-04-21 | End: 2021-04-22 | Stop reason: HOSPADM

## 2021-04-21 RX ORDER — ONDANSETRON 2 MG/ML
4 INJECTION INTRAMUSCULAR; INTRAVENOUS
Status: DISCONTINUED | OUTPATIENT
Start: 2021-04-21 | End: 2021-04-22 | Stop reason: HOSPADM

## 2021-04-21 RX ORDER — ONDANSETRON 2 MG/ML
INJECTION INTRAMUSCULAR; INTRAVENOUS AS NEEDED
Status: DISCONTINUED | OUTPATIENT
Start: 2021-04-21 | End: 2021-04-21 | Stop reason: HOSPADM

## 2021-04-21 RX ORDER — ACETAMINOPHEN 500 MG
1000 TABLET ORAL EVERY 8 HOURS
Status: DISCONTINUED | OUTPATIENT
Start: 2021-04-21 | End: 2021-04-22 | Stop reason: HOSPADM

## 2021-04-21 RX ORDER — CEFAZOLIN SODIUM/WATER 2 G/20 ML
2 SYRINGE (ML) INTRAVENOUS ONCE
Status: COMPLETED | OUTPATIENT
Start: 2021-04-21 | End: 2021-04-21

## 2021-04-21 RX ORDER — SUCCINYLCHOLINE CHLORIDE 100 MG/5ML
SYRINGE (ML) INTRAVENOUS AS NEEDED
Status: DISCONTINUED | OUTPATIENT
Start: 2021-04-21 | End: 2021-04-21 | Stop reason: HOSPADM

## 2021-04-21 RX ORDER — PANTOPRAZOLE SODIUM 40 MG/1
40 TABLET, DELAYED RELEASE ORAL DAILY
Status: DISCONTINUED | OUTPATIENT
Start: 2021-04-22 | End: 2021-04-22 | Stop reason: HOSPADM

## 2021-04-21 RX ORDER — PHENYLEPHRINE HCL IN 0.9% NACL 1 MG/10 ML
SYRINGE (ML) INTRAVENOUS AS NEEDED
Status: DISCONTINUED | OUTPATIENT
Start: 2021-04-21 | End: 2021-04-21 | Stop reason: HOSPADM

## 2021-04-21 RX ORDER — LIDOCAINE HYDROCHLORIDE 20 MG/ML
INJECTION, SOLUTION EPIDURAL; INFILTRATION; INTRACAUDAL; PERINEURAL AS NEEDED
Status: DISCONTINUED | OUTPATIENT
Start: 2021-04-21 | End: 2021-04-21 | Stop reason: HOSPADM

## 2021-04-21 RX ORDER — CHLORTHALIDONE 25 MG/1
25 TABLET ORAL EVERY OTHER DAY
Status: DISCONTINUED | OUTPATIENT
Start: 2021-04-22 | End: 2021-04-22 | Stop reason: HOSPADM

## 2021-04-21 RX ORDER — SODIUM CHLORIDE, SODIUM LACTATE, POTASSIUM CHLORIDE, CALCIUM CHLORIDE 600; 310; 30; 20 MG/100ML; MG/100ML; MG/100ML; MG/100ML
125 INJECTION, SOLUTION INTRAVENOUS CONTINUOUS
Status: DISCONTINUED | OUTPATIENT
Start: 2021-04-21 | End: 2021-04-21 | Stop reason: HOSPADM

## 2021-04-21 RX ORDER — CEFAZOLIN SODIUM/WATER 2 G/20 ML
2 SYRINGE (ML) INTRAVENOUS EVERY 8 HOURS
Status: COMPLETED | OUTPATIENT
Start: 2021-04-22 | End: 2021-04-22

## 2021-04-21 RX ORDER — ATORVASTATIN CALCIUM 20 MG/1
40 TABLET, FILM COATED ORAL
Status: DISCONTINUED | OUTPATIENT
Start: 2021-04-22 | End: 2021-04-22 | Stop reason: HOSPADM

## 2021-04-21 RX ORDER — FINASTERIDE 5 MG/1
5 TABLET, FILM COATED ORAL DAILY
Status: DISCONTINUED | OUTPATIENT
Start: 2021-04-22 | End: 2021-04-22 | Stop reason: HOSPADM

## 2021-04-21 RX ORDER — DEXAMETHASONE SODIUM PHOSPHATE 4 MG/ML
INJECTION, SOLUTION INTRA-ARTICULAR; INTRALESIONAL; INTRAMUSCULAR; INTRAVENOUS; SOFT TISSUE AS NEEDED
Status: DISCONTINUED | OUTPATIENT
Start: 2021-04-21 | End: 2021-04-21 | Stop reason: HOSPADM

## 2021-04-21 RX ORDER — OXYCODONE HYDROCHLORIDE 5 MG/1
5 TABLET ORAL
Status: DISCONTINUED | OUTPATIENT
Start: 2021-04-21 | End: 2021-04-22 | Stop reason: HOSPADM

## 2021-04-21 RX ORDER — FENTANYL CITRATE 50 UG/ML
25 INJECTION, SOLUTION INTRAMUSCULAR; INTRAVENOUS AS NEEDED
Status: DISCONTINUED | OUTPATIENT
Start: 2021-04-21 | End: 2021-04-21 | Stop reason: HOSPADM

## 2021-04-21 RX ORDER — AMOXICILLIN 250 MG
1 CAPSULE ORAL 2 TIMES DAILY
Status: DISCONTINUED | OUTPATIENT
Start: 2021-04-21 | End: 2021-04-22 | Stop reason: HOSPADM

## 2021-04-21 RX ORDER — KETOROLAC TROMETHAMINE 30 MG/ML
15 INJECTION, SOLUTION INTRAMUSCULAR; INTRAVENOUS
Status: DISCONTINUED | OUTPATIENT
Start: 2021-04-21 | End: 2021-04-22 | Stop reason: HOSPADM

## 2021-04-21 RX ORDER — TIZANIDINE 4 MG/1
4 TABLET ORAL 2 TIMES DAILY
Status: DISCONTINUED | OUTPATIENT
Start: 2021-04-22 | End: 2021-04-22 | Stop reason: HOSPADM

## 2021-04-21 RX ORDER — ROCURONIUM BROMIDE 10 MG/ML
INJECTION, SOLUTION INTRAVENOUS AS NEEDED
Status: DISCONTINUED | OUTPATIENT
Start: 2021-04-21 | End: 2021-04-21 | Stop reason: HOSPADM

## 2021-04-21 RX ORDER — DIPHENHYDRAMINE HCL 25 MG
25 CAPSULE ORAL
Status: DISCONTINUED | OUTPATIENT
Start: 2021-04-21 | End: 2021-04-22 | Stop reason: HOSPADM

## 2021-04-21 RX ORDER — HYDROMORPHONE HYDROCHLORIDE 1 MG/ML
0.5 INJECTION, SOLUTION INTRAMUSCULAR; INTRAVENOUS; SUBCUTANEOUS
Status: DISCONTINUED | OUTPATIENT
Start: 2021-04-21 | End: 2021-04-21 | Stop reason: HOSPADM

## 2021-04-21 RX ORDER — AMLODIPINE BESYLATE 5 MG/1
10 TABLET ORAL DAILY
Status: DISCONTINUED | OUTPATIENT
Start: 2021-04-22 | End: 2021-04-22 | Stop reason: HOSPADM

## 2021-04-21 RX ORDER — TRANEXAMIC ACID 10 MG/ML
1 INJECTION, SOLUTION INTRAVENOUS
Status: COMPLETED | OUTPATIENT
Start: 2021-04-21 | End: 2021-04-21

## 2021-04-21 RX ORDER — HYDROMORPHONE HYDROCHLORIDE 1 MG/ML
0.5 INJECTION, SOLUTION INTRAMUSCULAR; INTRAVENOUS; SUBCUTANEOUS
Status: DISCONTINUED | OUTPATIENT
Start: 2021-04-21 | End: 2021-04-22 | Stop reason: HOSPADM

## 2021-04-21 RX ORDER — ZOLPIDEM TARTRATE 5 MG/1
5 TABLET ORAL
Status: DISCONTINUED | OUTPATIENT
Start: 2021-04-21 | End: 2021-04-22 | Stop reason: HOSPADM

## 2021-04-21 RX ORDER — MIDAZOLAM HYDROCHLORIDE 1 MG/ML
INJECTION, SOLUTION INTRAMUSCULAR; INTRAVENOUS AS NEEDED
Status: DISCONTINUED | OUTPATIENT
Start: 2021-04-21 | End: 2021-04-21 | Stop reason: HOSPADM

## 2021-04-21 RX ADMIN — SENNOSIDES AND DOCUSATE SODIUM 1 TABLET: 8.6; 5 TABLET ORAL at 20:14

## 2021-04-21 RX ADMIN — ROCURONIUM BROMIDE 5 MG: 10 INJECTION, SOLUTION INTRAVENOUS at 16:44

## 2021-04-21 RX ADMIN — PROPOFOL 180 MG: 10 INJECTION, EMULSION INTRAVENOUS at 16:44

## 2021-04-21 RX ADMIN — SODIUM CHLORIDE, SODIUM LACTATE, POTASSIUM CHLORIDE, AND CALCIUM CHLORIDE: 600; 310; 30; 20 INJECTION, SOLUTION INTRAVENOUS at 17:35

## 2021-04-21 RX ADMIN — ONDANSETRON HYDROCHLORIDE 4 MG: 2 INJECTION INTRAMUSCULAR; INTRAVENOUS at 18:37

## 2021-04-21 RX ADMIN — Medication 100 MG: at 16:44

## 2021-04-21 RX ADMIN — SODIUM CHLORIDE, SODIUM LACTATE, POTASSIUM CHLORIDE, AND CALCIUM CHLORIDE 125 ML/HR: 600; 310; 30; 20 INJECTION, SOLUTION INTRAVENOUS at 13:46

## 2021-04-21 RX ADMIN — SODIUM CHLORIDE, SODIUM LACTATE, POTASSIUM CHLORIDE, AND CALCIUM CHLORIDE 100 ML/HR: 600; 310; 30; 20 INJECTION, SOLUTION INTRAVENOUS at 20:14

## 2021-04-21 RX ADMIN — DEXAMETHASONE SODIUM PHOSPHATE 4 MG: 4 INJECTION, SOLUTION INTRAMUSCULAR; INTRAVENOUS at 17:11

## 2021-04-21 RX ADMIN — ROPIVACAINE HYDROCHLORIDE 25 ML: 5 INJECTION, SOLUTION EPIDURAL; INFILTRATION; PERINEURAL at 15:23

## 2021-04-21 RX ADMIN — ACETAMINOPHEN 1000 MG: 500 TABLET ORAL at 20:14

## 2021-04-21 RX ADMIN — Medication 50 MCG: at 17:39

## 2021-04-21 RX ADMIN — ROCURONIUM BROMIDE 20 MG: 10 INJECTION, SOLUTION INTRAVENOUS at 17:34

## 2021-04-21 RX ADMIN — Medication 100 MCG: at 18:36

## 2021-04-21 RX ADMIN — SODIUM CHLORIDE, SODIUM LACTATE, POTASSIUM CHLORIDE, AND CALCIUM CHLORIDE: 600; 310; 30; 20 INJECTION, SOLUTION INTRAVENOUS at 18:52

## 2021-04-21 RX ADMIN — MIDAZOLAM 4 MG: 1 INJECTION INTRAMUSCULAR; INTRAVENOUS at 15:17

## 2021-04-21 RX ADMIN — ROCURONIUM BROMIDE 45 MG: 10 INJECTION, SOLUTION INTRAVENOUS at 16:56

## 2021-04-21 RX ADMIN — TRANEXAMIC ACID 1 G: 10 INJECTION, SOLUTION INTRAVENOUS at 18:37

## 2021-04-21 RX ADMIN — TRANEXAMIC ACID 1 G: 10 INJECTION, SOLUTION INTRAVENOUS at 16:59

## 2021-04-21 RX ADMIN — SUGAMMADEX 280 MG: 100 INJECTION, SOLUTION INTRAVENOUS at 18:56

## 2021-04-21 RX ADMIN — LIDOCAINE HYDROCHLORIDE 80 MG: 20 INJECTION, SOLUTION EPIDURAL; INFILTRATION; INTRACAUDAL; PERINEURAL at 16:44

## 2021-04-21 RX ADMIN — PHENOL 1 SPRAY: 1.5 LIQUID ORAL at 21:03

## 2021-04-21 RX ADMIN — CEFAZOLIN 2 G: 1 INJECTION, POWDER, FOR SOLUTION INTRAVENOUS at 16:49

## 2021-04-21 NOTE — INTERVAL H&P NOTE
Update History & Physical 
 
The Patient's History and Physical was reviewed with the patient and I examined the patient. There was no change. The surgical site was confirmed by the patient and me. Plan:  The risk, benefits, expected outcome, and alternative to the recommended procedure have been discussed with the patient. Patient understands and wants to proceed with the procedure.  
 
Electronically signed by Liana Dela Cruz MD on 4/21/2021 at 3:54 PM

## 2021-04-21 NOTE — ANESTHESIA PREPROCEDURE EVALUATION
Relevant Problems   No relevant active problems       Anesthetic History   No history of anesthetic complications            Review of Systems / Medical History  Patient summary reviewed, nursing notes reviewed and pertinent labs reviewed    Pulmonary  Within defined limits                 Neuro/Psych   Within defined limits           Cardiovascular    Hypertension              Exercise tolerance: >4 METS     GI/Hepatic/Renal     GERD           Endo/Other      Hypothyroidism  Arthritis     Other Findings              Physical Exam    Airway  Mallampati: III    Neck ROM: normal range of motion   Mouth opening: Normal     Cardiovascular  Regular rate and rhythm,  S1 and S2 normal,  no murmur, click, rub, or gallop  Rhythm: regular  Rate: normal         Dental  No notable dental hx       Pulmonary  Breath sounds clear to auscultation               Abdominal  GI exam deferred       Other Findings            Anesthetic Plan    ASA: 2  Anesthesia type: general      Post-op pain plan if not by surgeon: peripheral nerve block single    Induction: Intravenous  Anesthetic plan and risks discussed with: Patient      ISB for postop pain explained. Risks explained including bleeding, infection, nerve injury, failed block. Procedure explained as elective. Pt understands and desires to proceed.

## 2021-04-21 NOTE — BRIEF OP NOTE
Brief Postoperative Note    Patient: Jing Rea  YOB: 1945  MRN: [id number]    Date of Procedure: 4/21/2021     Pre-Op Diagnosis: ROTATOR CUFF TEAR RIGHT SHOULDER    Post-Op Diagnosis: Same as preoperative diagnosis. Procedure(s):  RIGHT REVERSE TOTAL SHOULDER ARTHROPLASTY,TORNIER    Surgeon(s):  Cleta Severance, MD    Surgical Assistant: Physician Assistant: Pedro Concepcion PA-C  Surg Asst-1: [facility]    Anesthesia: General     Estimated Blood Loss (mL): 977     Complications: None    Specimens: * No specimens in log *     Implants:   Implant Name Type Inv.  Item Serial No.  Lot No. LRB No. Used Action   BASEPLATE RUSSELL [id number] HALF WDG AUG REVERSED [employer] - [id number]  BASEPLATE RUSSELL [id number] 09ONE HALF WDG AUG REVERSED [employer] [id number] [employer]  Right 1 Implanted   SCREW BONE L35MM DIA6.5MM TI ST FULL THRD CTRL FOR RUSSELL - [id number]  SCREW BONE L35MM DIA6.5MM TI ST FULL THRD CTRL FOR RUSSELL  [employer] [id number] Right 1 Implanted   SCREW BONE L14MM DIA5MM TI ST FULL THRD PERIPH FOR RUSSELL - [id number]  SCREW BONE L14MM DIA5MM TI ST FULL THRD PERIPH FOR RUSSELL  [employer] [id number] Right 1 Implanted   SCREW BONE L30MM DIA5MM TI ST FULL THRD PERIPH FOR RUSSELL - [id number]  SCREW BONE L30MM DIA5MM TI ST FULL THRD PERIPH FOR RUSSELL  [employer] [id number] Right 1 Implanted   SCREW BONE L22MM DIA5MM TI ST FULL THRD PERIPH FOR RUSSELL - [id number]  SCREW BONE L22MM DIA5MM TI ST FULL THRD PERIPH FOR Lei [employer]WD [id number] Right 1 Implanted   SPHERE RUSSELL [id number] STD REVERSED AEQUALIS PERFORMA - [id number]  SPHERE RUSSELL [id number] STD REVERSED [employer]QUALIS PERFORMA [id number] [employer]  Right 1 Implanted   SCREW BONE L38MM CECIL 5MM THRD PERIPH FOR RUSSELL    [employer] [id number] Right 1 Implanted   TRAY HUM THK+0MM 15MM OFFSET SHLDR LO REVERSED [employer] - [id number]  TRAY HUM THK+0MM 15MM OFFSET SHLDR LO REVERSED [employer] [id number] MO SANON_ N/A Right 1 Implanted   INSERT SHLDR C [id number] THK+6MM 7. 5DEG REVERSED AEQUALIS - [id number]  INSERT SHLDR C [id number] THK+6MM 7. 5DEG REVERSED Saturnino Lopezbaum [id number] [employer] N/A Right 1 Implanted   STEM HUM L74MM DIA3A 127. 5DEG STD SHLDR PTC AEQUALIS ASCEND - [id number]  STEM HUM L74MM DIA3A 127. 5DEG STD SHLDR PTC Mariann Edwards [id number] [employer] N/A Right 1 Implanted       Drains: * No LDAs found *    Findings: Retracted rotator cuff tear with proximal humeral migration    Electronically Signed by Gregorio Harris MD on 4/21/2021 at 6:23 PM

## 2021-04-21 NOTE — PERIOP NOTES
Reviewed PTA medication list with patient/caregiver and patient/caregiver denies any additional medications. Patient admits to having a responsible adult care for them at home for at least 24 hours after surgery. Patient encouraged to use gown warming system and informed that using said warming gown to regulate body temperature prior to a procedure has been shown to help reduce the risks of blood clots and infection. Patient's pharmacy of choice verified and documented in PTA medication section. Dual skin assessment & fall risk band verification completed with Adrian Amato.

## 2021-04-21 NOTE — ANESTHESIA POSTPROCEDURE EVALUATION
Post-Anesthesia Evaluation and Assessment    Cardiovascular Function/Vital Signs  Visit Vitals  /73   Pulse 77   Temp 36.2 °C (97.1 °F)   Resp 17   Ht 5' 7\" (1.702 m)   Wt 69.4 kg (153 lb 1 oz)   SpO2 98%   BMI 23.97 kg/m²       Patient is status post Procedure(s):  RIGHT REVERSE TOTAL SHOULDER ARTHROPLASTY,TORNIER. Nausea/Vomiting: Controlled. Postoperative hydration reviewed and adequate. Pain:  Pain Scale 1: Numeric (0 - 10) (04/21/21 1930)  Pain Intensity 1: 0 (04/21/21 1930)   Managed. Neurological Status:   Neuro (WDL): Exceptions to WDL (04/21/21 1930)   At baseline. Mental Status and Level of Consciousness: Arousable. Pulmonary Status:   O2 Device: Nasal cannula (04/21/21 1924)   Adequate oxygenation and airway patent. Complications related to anesthesia: None    Post-anesthesia assessment completed. No concerns. Patient has met all discharge requirements. Signed By: Danyelle Hanna MD    April 21, 2021               Procedure(s):  RIGHT REVERSE TOTAL SHOULDER ARTHROPLASTY,TORNIER. general    <BSHSIANPOST>    INITIAL Post-op Vital signs:   Vitals Value Taken Time   /73 04/21/21 1930   Temp 36.2 °C (97.1 °F) 04/21/21 1913   Pulse 75 04/21/21 1935   Resp 15 04/21/21 1935   SpO2 98 % 04/21/21 1935   Vitals shown include unvalidated device data.

## 2021-04-21 NOTE — ANESTHESIA PROCEDURE NOTES
Peripheral Block    Start time: 4/21/2021 3:17 PM  End time: 4/21/2021 3:23 PM  Performed by: Tyson Vargas MD  Authorized by: Tyson Vargas MD       Pre-procedure: Indications: at surgeon's request and post-op pain management    Preanesthetic Checklist: patient identified, risks and benefits discussed, site marked, timeout performed, anesthesia consent given and patient being monitored    Timeout Time: 15:17          Block Type:   Block Type: Interscalene  Laterality:  Right  Monitoring:  Standard ASA monitoring, frequent vital sign checks, heart rate, responsive to questions, oxygen and continuous pulse ox  Injection Technique:  Single shot  Procedures: ultrasound guided and nerve stimulator    Patient Position: supine  Prep: chlorhexidine    Location:  Interscalene  Needle Type:  Stimuplex  Needle Gauge:  20 G  Needle Localization:  Ultrasound guidance and nerve stimulator  Motor Response comment:   Motor Response: minimal motor response >0.4 mA   Medication Injected:  Ropivacaine (PF) (NAROPIN) 5 mg/mL (0.5 %) injection, 25 mL  Med Admin Time: 4/21/2021 3:23 PM    Assessment:  Number of attempts:  1  Injection Assessment:  Incremental injection every 5 mL, negative aspiration for CSF, no paresthesia, local visualized surrounding nerve on ultrasound, negative aspiration for blood, no intravascular symptoms and ultrasound image on chart  Patient tolerance:  Patient tolerated the procedure well with no immediate complications  Patient able to move  right fingers/hand/wrist/arm/elbow/shoulder after block. No sensory or motor block.

## 2021-04-21 NOTE — PERIOP NOTES
TRANSFER - OUT REPORT:    Verbal report given to Helga Ignacio RN (name) on Claudia Richter  being transferred to [facility](unit) for routine post - op       Report consisted of patients Situation, Background, Assessment and   Recommendations(SBAR). Information from the following report(s) SBAR, Kardex, OR Summary, MAR and Cardiac Rhythm NSR was reviewed with the receiving nurse. Lines:   Peripheral IV 04/21/21 Left Hand (Active)   Site Assessment Clean, dry, & intact 04/21/21 1930   Phlebitis Assessment 0 04/21/21 1930   Infiltration Assessment 0 04/21/21 1930   Dressing Status Clean, dry, & intact 04/21/21 1930   Dressing Type Transparent;Tape 04/21/21 1930   Hub Color/Line Status Green; Infusing;Patent 04/21/21 1930        Opportunity for questions and clarification was provided.       Patient transported with:   O2 @ 2 liters  Registered Nurse  [facility]

## 2021-04-22 VITALS
RESPIRATION RATE: 17 BRPM | WEIGHT: 153.06 LBS | HEIGHT: 67 IN | HEART RATE: 63 BPM | DIASTOLIC BLOOD PRESSURE: 59 MMHG | OXYGEN SATURATION: 100 % | TEMPERATURE: 97.4 F | SYSTOLIC BLOOD PRESSURE: 103 MMHG | BODY MASS INDEX: 24.02 KG/M2

## 2021-04-22 PROBLEM — Z96.611 STATUS POST REVERSE ARTHROPLASTY OF RIGHT SHOULDER: Status: ACTIVE | Noted: 2021-04-22

## 2021-04-22 PROCEDURE — 74011250636 HC RX REV CODE- 250/636: Performed by: PHYSICIAN ASSISTANT

## 2021-04-22 PROCEDURE — 97161 PT EVAL LOW COMPLEX 20 MIN: CPT

## 2021-04-22 PROCEDURE — 74011250637 HC RX REV CODE- 250/637: Performed by: PHYSICIAN ASSISTANT

## 2021-04-22 PROCEDURE — 97535 SELF CARE MNGMENT TRAINING: CPT

## 2021-04-22 PROCEDURE — 74011000250 HC RX REV CODE- 250: Performed by: PHYSICIAN ASSISTANT

## 2021-04-22 PROCEDURE — 97110 THERAPEUTIC EXERCISES: CPT

## 2021-04-22 PROCEDURE — 97165 OT EVAL LOW COMPLEX 30 MIN: CPT

## 2021-04-22 RX ORDER — OXYCODONE AND ACETAMINOPHEN 5; 325 MG/1; MG/1
1-2 TABLET ORAL
Qty: 56 TAB | Refills: 0 | Status: SHIPPED | OUTPATIENT
Start: 2021-04-22 | End: 2021-05-22

## 2021-04-22 RX ADMIN — SENNOSIDES AND DOCUSATE SODIUM 1 TABLET: 8.6; 5 TABLET ORAL at 09:09

## 2021-04-22 RX ADMIN — FINASTERIDE 5 MG: 5 TABLET, FILM COATED ORAL at 09:10

## 2021-04-22 RX ADMIN — LOSARTAN POTASSIUM 100 MG: 50 TABLET, FILM COATED ORAL at 09:09

## 2021-04-22 RX ADMIN — TIZANIDINE 4 MG: 4 TABLET ORAL at 00:27

## 2021-04-22 RX ADMIN — OXYCODONE 5 MG: 5 TABLET ORAL at 06:27

## 2021-04-22 RX ADMIN — CEFAZOLIN 2 G: 10 INJECTION, POWDER, FOR SOLUTION INTRAVENOUS at 09:10

## 2021-04-22 RX ADMIN — AMLODIPINE BESYLATE 10 MG: 5 TABLET ORAL at 09:10

## 2021-04-22 RX ADMIN — CHLORTHALIDONE 25 MG: 25 TABLET ORAL at 00:12

## 2021-04-22 RX ADMIN — POLYETHYLENE GLYCOL 3350 17 G: 17 POWDER, FOR SOLUTION ORAL at 09:10

## 2021-04-22 RX ADMIN — ATORVASTATIN CALCIUM 40 MG: 20 TABLET, FILM COATED ORAL at 00:12

## 2021-04-22 RX ADMIN — PANTOPRAZOLE SODIUM 40 MG: 40 TABLET, DELAYED RELEASE ORAL at 09:09

## 2021-04-22 RX ADMIN — CEFAZOLIN 2 G: 10 INJECTION, POWDER, FOR SOLUTION INTRAVENOUS at 00:12

## 2021-04-22 RX ADMIN — ACETAMINOPHEN 1000 MG: 500 TABLET ORAL at 03:06

## 2021-04-22 RX ADMIN — ACETAMINOPHEN 1000 MG: 500 TABLET ORAL at 12:36

## 2021-04-22 RX ADMIN — OXYCODONE 10 MG: 5 TABLET ORAL at 11:00

## 2021-04-22 RX ADMIN — TAMSULOSIN HYDROCHLORIDE 0.4 MG: 0.4 CAPSULE ORAL at 09:09

## 2021-04-22 RX ADMIN — TIZANIDINE 4 MG: 4 TABLET ORAL at 09:10

## 2021-04-22 NOTE — PROGRESS NOTES
Phone report received from [place], [facility]. Report included the following information SBAR, Kardex, OR Summary, Procedure Summary, Intake/Output, MAR, Recent Results and Med Rec Status. 6131 Received patient from PACU in satisfactory condition alert and oriented X 4 on 2 L of oxygen via nasal cannula. symmetrical chest movement  . Assumed care care of patient ,patient rates pain at 0/10. dual skin assessment completed with Arlet Ingram RN; no pressure areas noted. No other skin integrity issues noted. Fall risk band and allergy band in place. Patient is calm and cooperative. Numbness in the right arm but no tingling . Palpable peripheral pulses strong and equal bilateral. Lung sound clear bilaterally, Respirations even and unlabored no use of accessory muscles. Patient was educated on incentive spirometer and patient teach back method was used to demonstrate understanding. IS was up to 2000. Abdomen is soft and non tender, bowel sounds active X 4. Skin is warm color appropriate to ethnicity . optifoam dressing on right shoulder is clean dry and intact. dresing to the 18 G PIV on the left hand is clean dry and intact. SCD's applied bilaterally. Bed is locked at the lowest position phone , personal items and phone within reach.

## 2021-04-22 NOTE — PROGRESS NOTES
Problem: Mobility Impaired (Adult and Pediatric)  Goal: *Acute Goals and Plan of Care (Insert Text)  Description: Physical Therapy short term goals initiated 04/22/21, to be achieved in 2 days. Pt will:   1. Perform bed mobility with indep in prep for OOB activity. 2. Perform sit <> stand transfers with LRAD and S in prep for functional mobility and ambulation. 3. Ambulate 150 ft with LRAD and S/SBA in prep for household and community mobility. 4. Ascend/descend 3 stairs with B HR and S/SBA for safe home entry. 5. Perform HEP with S in order to maintain R UE strength and ROM. Note: [x]  Patient has met MD shelly catalan for d/c home   [x]  Recommend HH with 24 hour adult care   []  Benefit from additional acute PT session to address:      PHYSICAL THERAPY EVALUATION    Patient: Scooter Mckeon (19 y.o. male)  Date: 4/22/2021  Primary Diagnosis: Status post reverse arthroplasty of right shoulder [Z96.611]  Procedure(s) (LRB):  RIGHT REVERSE TOTAL SHOULDER ARTHROPLASTY,TORNIER (Right) 1 Day Post-Op   Precautions:  Fall, Other (comment)(s/p R TSR)  WBAT  PLOF: Pt was independent with household and community mobility with no AD PTA. ASSESSMENT :  Based on the objective data described below, the patient presents with decr R UE sensation at shoulder, decr balance, decr activity tolerance, and R shoulder pain resulting in deficits in transfers and gait quality. Pt sitting EOB on PT entry, rated R shoulder pain 8/10. Pt with sling donned on R arm, provided education on appropriate use and fit of sling with pt verbalizing understanding. Provided pt education on shoulder precautions including preventing active shoulder motion and R shoulder ER past neutral. Pt educated on HEP including R hand, wrist, and elbow AROM, and R shoulder pendulums. Pt able to perform pendulums with vc for appropriate weight shift and to avoid R shoulder muscle activation. Pt required S for sit <> stand transfers with no AD. Pt ambulated 150ft in palomo with no AD and SBA/S demonstrating decr speed, no LOB or path deviation. Pt ascended/descended 3 stairs with L HR and SBA. Pt educated on importance of early mobility, use of ice to decr pain and inflammation, and home safety. Pt would benefit from additional skilled therapy after discharge in order to incr functional mobility and promote return to PLOF. Recommend HHPT after discharge. Patient will benefit from skilled intervention to address the above impairments. Patient's rehabilitation potential is considered to be Good  Factors which may influence rehabilitation potential include:   []         None noted  []         Mental ability/status  []         Medical condition  []         Home/family situation and support systems  []         Safety awareness  [x]         Pain tolerance/management  []         Other:      PLAN :  Recommendations and Planned Interventions:   [x]           Bed Mobility Training             []    Neuromuscular Re-Education  [x]           Transfer Training                   []    Orthotic/Prosthetic Training  [x]           Gait Training                          [x]    Modalities  [x]           Therapeutic Exercises           []    Edema Management/Control  [x]           Therapeutic Activities            [x]    Family Training/Education  [x]           Patient Education  []           Other (comment):    Frequency/Duration: Patient will be followed by physical therapy twice daily to address goals. Discharge Recommendations: Home Health  Further Equipment Recommendations for Discharge: N/A     SUBJECTIVE:   Patient stated rAturo Villalobos had surgery on my arm, not my leg (when asked if he used AD PTA).     OBJECTIVE DATA SUMMARY:     Past Medical History:   Diagnosis Date    Arthritis     Cancer ([facility] [address] 2020    prostate     Dry eyes, bilateral     GERD (gastroesophageal reflux disease)     Hypertension     Thyroid disease      Past Surgical History:   Procedure Laterality Date HX CATARACT REMOVAL Bilateral     HX HEENT  2015    detached retina    HX PROSTATE SURGERY  2020    HX TONSILLECTOMY       Barriers to Learning/Limitations: None  Compensate with: N/A  Home Situation:  Home Situation  Home Environment: Private residence  # Steps to Enter: 6  Rails to Enter: Yes  Hand Rails : Bilateral(wide)  One/Two Story Residence: Two story  # of Interior Steps: 14  Interior Rails: Right  Lift Chair Available: No  Living Alone: No  Support Systems: Family member(s)  Patient Expects to be Discharged to[de-identified] Private residence  Current DME Used/Available at Home: None  Tub or Shower Type: Shower(with built in seat)  Critical Behavior:  Neurologic State: Alert  Orientation Level: Oriented X4  Cognition: Follows commands  Safety/Judgement: Awareness of environment  Psychosocial  Patient Behaviors: Calm; Cooperative  Purposeful Interaction: Yes  Pt Identified Daily Priority: Clinical issues (comment)  Caritas Process: Nurture loving kindness;Enable balaji/hope;Establish trust;Nurture spiritual self;Teaching/learning; Attend basic human needs;Create healing environment  Caring Interventions: Reassure  Reassure: Therapeutic listening; Informing; Acceptance  Skin Condition/Temp: Dry;Warm  Skin Integumentary  Skin Color: Appropriate for ethnicity  Skin Condition/Temp: Dry;Warm  Strength:    Strength: Generally decreased, functional  Tone & Sensation:   Tone: Normal  Sensation: Impaired(R shoulder)  Range Of Motion:  AROM: Generally decreased, functional  Functional Mobility:  Bed Mobility:  Scooting: Supervision  Transfers:  Sit to Stand: Supervision  Stand to Sit: Supervision  Bed to Chair: Supervision  Balance:   Sitting: Intact  Standing: Intact  Ambulation/Gait Training:  Distance (ft): 150 Feet (ft)  Assistive Device: Gait belt  Ambulation - Level of Assistance: Supervision;Stand-by assistance  Speed/Magalie: Slow  Step Length: Left shortened;Right shortened  Interventions: Safety awareness training;Verbal cues;Tactile cues  Stairs:  Number of Stairs Trained: 3  Stairs - Level of Assistance: Contact guard assistance;Stand-by assistance  Rail Use: Left   Therapeutic Exercises:   Pendulums on R shoulder: forward/backwards, R/L, clockwise/counterclockwise x10 reps each. Encouraged R hand, wrist, and elbow AROM to tolerance. Pain:  Pain level pre-treatment: 8/10   Pain level post-treatment: 8/10   Pain Intervention(s) : Medication (see MAR); Rest, Ice, Repositioning  Response to intervention: Nurse notified, See doc flow  Activity Tolerance:   Pt tolerated amb in palomo and stair negotiation without LOB, no AD. Rated R shoulder pain 8/10 t/o session. Please refer to the flowsheet for vital signs taken during this treatment. After treatment:   []         Patient left in no apparent distress sitting up in chair  [x]         Patient left in no apparent distress in bed  [x]         Call bell left within reach  [x]         Nursing notified  []         Caregiver present  []         Bed alarm activated  []         SCDs applied    COMMUNICATION/EDUCATION:   [x]         Role of Physical Therapy in the acute care setting. [x]         Fall prevention education was provided and the patient/caregiver indicated understanding. [x]         Patient/family have participated as able in goal setting and plan of care. [x]         Patient/family agree to work toward stated goals and plan of care. []         Patient understands intent and goals of therapy, but is neutral about his/her participation. []         Patient is unable to participate in goal setting/plan of care: ongoing with therapy staff.  []         Other:     Thank you for this referral.  Mariaelena Segura   Time Calculation: 23 mins      Eval Complexity: History: LOW Complexity : Zero comorbidities / personal factors that will impact the outcome / POCExam:LOW Complexity : 1-2 Standardized tests and measures addressing body structure, function, activity limitation and / or participation in recreation  Presentation: LOW Complexity : Stable, uncomplicated  Clinical Decision Making:Low Complexity    Overall Complexity:LOW

## 2021-04-22 NOTE — PROGRESS NOTES
Problem: Falls - Risk of  Goal: *Absence of Falls  Description: Document Joni Arellano Fall Risk and appropriate interventions in the flowsheet.   Outcome: Progressing Towards Goal  Note: Fall Risk Interventions:  Mobility Interventions: Communicate number of staff needed for ambulation/transfer, Patient to call before getting OOB         Medication Interventions: Assess postural VS orthostatic hypotension, Patient to call before getting OOB, Teach patient to arise slowly    Elimination Interventions: Call light in reach, Patient to call for help with toileting needs, Urinal in reach

## 2021-04-22 NOTE — DISCHARGE SUMMARY
DISCHARGE SUMMARY     PATIENT: Scooter Mckeon     MRN: [id number]   ADMIT DATE: 2021   [place]   DISCHARGE DATE:  21     ATTENDING: Fausto Saini MD   DICTATING: Pooja Hernandez PA-C     ADMISSION DIAGNOSIS: ROTATOR CUFF TEAR RIGHT SHOULDER    DISCHARGE DIAGNOSIS: Status post ROTATOR CUFF TEAR RIGHT SHOULDER    HISTORY OF PRESENT ILLNESS: The patient is a 78y.o. year-old male   with ongoing Right shoulder pain secondary to rotator cuff arthropathy and osteoarthritis of Right shoulder. The patient's pain has persisted and progressed despite conservative treatments and therapies. The patient has at this time opted for surgical intervention. PAST MEDICAL HISTORY:   Past Medical History:   Diagnosis Date    Arthritis     Cancer ([address]     prostate     Dry eyes, bilateral     GERD (gastroesophageal reflux disease)     Hypertension     Thyroid disease        PAST SURGICAL HISTORY:   Past Surgical History:   Procedure Laterality Date    HX CATARACT REMOVAL Bilateral     HX HEENT      detached retina    HX PROSTATE SURGERY      HX TONSILLECTOMY         ALLERGIES:   Allergies   Allergen Reactions    Bee Sting [Sting, Bee] Shortness of Breath and Palpitations     tachycardia        CURRENT MEDICATIONS:  A list of medications prior to the time of admission include:  Prior to Admission medications    Medication Sig Start Date End Date Taking? Authorizing Provider   oxyCODONE-acetaminophen (PERCOCET) 5-325 mg per tablet Take 1-2 Tabs by mouth every four (4) hours as needed for Pain for up to 30 days. Max Daily Amount: 12 Tabs. 21 Yes Riley Cheng PA-C   docusate sodium (COLACE) 50 mg capsule Take 2 Caps by mouth three (3) times daily as needed for Constipation for up to 90 days. 21 Yes Riley Cheng PA-C   OTHER three (3) times daily.  [facility]   Yes Provider, Historical   potassium chloride (K-DUR, KLOR-CON) 20 mEq tablet Take 20 mEq by mouth two (2) times a day. Yes Provider, Historical   dicyclomine HCl (BENTYL PO) Take 20 mg by mouth three (3) times daily. Yes Provider, Historical   pantoprazole (PROTONIX) 40 mg tablet Take 40 mg by mouth daily. Yes Provider, Historical   sodium chloride (RETAINE NACL OP) Apply  to eye. 1/4 inch bead ointment at bedtime every night   Yes Provider, Historical   finasteride (PROSCAR) 5 mg tablet Take 5 mg by mouth daily. Yes Provider, Historical   atorvastatin (LIPITOR) 40 mg tablet Take 40 mg by mouth nightly. Yes Provider, Historical   amLODIPine (NORVASC) 10 mg tablet Take 10 mg by mouth daily. Yes Provider, Historical   losartan (COZAAR) 100 mg tablet Take 100 mg by mouth daily. Yes Provider, Historical   krill oil 500 mg cap Take 400 mg by mouth daily. Yes Provider, Historical   cycloSPORINE (RESTASIS) 0.05 % ophthalmic emulsion Administer 1 Drop to both eyes two (2) times a day. Yes Provider, Historical   DEXTRAN 70/HYPROMELLOSE (GENTEAL TEARS OP) Apply 1-2 Drops to eye three (3) times daily as needed. Both eyes    Yes Provider, Historical   tadalafiL (Cialis) 5 mg tablet Take 5 mg by mouth daily. Provider, Historical   tadalafiL (Cialis) 20 mg tablet Take 20 mg by mouth as needed for Erectile Dysfunction. Provider, Historical   TURMERIC PO Take  by mouth daily. Provider, Historical   tamsulosin (Flomax) 0.4 mg capsule Take 0.4 mg by mouth daily. Provider, Historical   simethicone (Gas-X) 125 mg capsule Take 125 mg by mouth four (4) times daily as needed for Flatulence. Provider, Historical   tiZANidine (ZANAFLEX) 4 mg capsule Take 4 mg by mouth two (2) times a day. Provider, Historical   EPINEPHrine (EPIPEN) 0.3 mg/0.3 mL injection 0.3 mg by IntraMUSCular route once as needed. Provider, Historical   diclofenac (VOLTAREN) 1 % gel Apply 4 g to affected area four (4) times daily as needed.     Provider, Historical   cholecalciferol (VITAMIN D3) 1,000 unit tablet Take 8,000 Units by mouth daily. Provider, Historical   chlorthalidone (HYGROTEN) 25 mg tablet Take 25 mg by mouth every other day. Provider, Historical       FAMILY HISTORY: History reviewed. No pertinent family history. SOCIAL HISTORY:   Social History     Socioeconomic History    Marital status:      Spouse name: Not on file    Number of children: Not on file    Years of education: Not on file    Highest education level: Not on file   Tobacco Use    Smoking status: Former Smoker     Quit date: 1985     Years since quittin.3    Smokeless tobacco: Never Used   Substance and Sexual Activity    Alcohol use: Yes     Comment: 1-2 beers or 1 glass of wine monthly    Drug use: No     Comment: H/O in the     Sexual activity: Yes       REVIEW OF SYSTEMS: All review of systems are negative. PHYSICAL EXAMINATION: For a detailed physical exam, please refer to the patient's chart. HOSPITAL COURSE: The patient was taken to surgery the day of admission. he underwent Right reverse total shoulder replacement. Operative course was benign. Estimated blood loss approximately 200 mL. The patient was taken to the PACU in stable condition and was later taken to the floor in stable condition. During his hospital stay, the patient progressed well with physical therapy and occupational therapy, adherent to instructions. he was placed on mechanical sequential compression devices as well as an early mobilization protocol for DVT prophylaxis. his pain has been well controlled with oral pain medications. his vitals have remained stable. he has also remained hemodynamically stable. DISCHARGE INSTRUCTIONS: The patient is to be transferred to home with home health.  he is to continue on his prior medications per the medication reconciliation form, to which we will add:         1) Percocet 5/325 1-2 tabs by mouth every 4-6 hours as needed for pain         2) Colace 100 mg tab by mouth 3x daily as needed for constipation       The patient is to continue with physical therapy to work on pendulum exercises of his Right shoulder as well as active and passive range of motion exercises of the elbow, wrist, hand and fingers on the  Right. The patient is to ambulate as tolerable and maximize time out of bed during the day. The patient is to continue to keep his incision dry. The patient is to followup with Dr. Rola Saucedo in the office approximately 10-14 days status post for x-rays and further evaluation.     Christiano Richard PA-C  4/22/20218:00 AM

## 2021-04-22 NOTE — NURSE NAVIGATOR
Liam Lopez post spine rounding. Patient educated: Activity:  OOB for all meals, walk every hour to prevent blood clots  Follow back precautions (no bending, twisting, lifting &  Log roll in/out of bed). If your surgeon wants you to wear a back brace. Wear it per your surgeon's instructions. Promoting Circulation:   Use SCD pumps except when walking. Ankle pumps 10 times an hour at hospital & home. Pain Control:  Pain medications side effects discussed. Use ice, distraction, moving, & change position to also help with pain. Narcotics and anesthesia cause constipation so it is important to take stool softener/mild laxative daily while on narcotics. Incentive Spirometry:    Use of incentive spirometer 10 x/hr. Diet:   Eat for healing. Drink enough fluids so urine is the color of lemonade. Medication causes nausea and dizziness if taken on an empty stomach so instructed to make sure to eat a snack before taking any medicaitons. Patient Safety:   Call light & belongings in reach. Call for help when want to walk or get OOB. Liam Lopez verbalized understand. Given the opportunity for asking questions.

## 2021-04-22 NOTE — DISCHARGE INSTRUCTIONS
[facility]    Patient Discharge Instructions    Praful Paez / [id number] : 1945    Admitted 2021 Discharged: 2021     IF YOU HAVE ANY PROBLEMS ONCE YOU ARE AT HOME CALL THE FOLLOWING NUMBERS:   Main office number: [phone]    Your follow up appointment to see Dr. Linsey Treadwell is scheduled in 1-2 weeks. If you are unsure of your appointment date call the office at [phone]. Take Home Medications     · Resume your home medictions as directed  · A prescription for pain medication has been given   · It is important that you take the medication exactly as they are prescribed. · Keep your medication in the bottles provided by the pharmacist and keep a list of the medication names, dosages, and times to be taken in your wallet. · Do not take other medications without consulting your doctor. · Note:  If you have already received and/or filled a prescription for one or more of the medications you've received a prescription for when leaving the hospital, you may disregard the duplicate prescription. What to do at [address] [place] [address] Ave your prehospital diet. If you have excessive nausea or vomitting call your doctor's office     Perform Shoulder Pendulum exercises several times daily. Wear the sling most of the time. Take your arm out of the sling to move your elbow, wrist and fingers. Do not  anything with your arm heavier than a pencil. Keep incision DRY. You may need to sponge bathe to avoid getting the incision wet. When to Call    - Call if you have a temperature greater then 101  - Unable to keep food down  - Are unable to bear any wieght   - Need a pain medication refill     Information obtained by :  I understand that if any problems occur once I am at home I am to contact my physician. I understand and acknowledge receipt of the instructions indicated above. Physician's or R.N.'s Signature                                                                  Date/Time                                                                                                                                              Patient or Representative Signature                                                          Date/Time

## 2021-04-22 NOTE — PROGRESS NOTES
0729  Pt is awake, alert, oriented x4. Resting in bed. Optifoam dressing to right arm dry and intact. Pt  Is wearing a right arm sling. 0915  Pain level is 8/10. Pt has ice pack to right shoulder. Lungs are clear. Abdomen soft with active BS. Pt was seen by  TAMARA Chung. Pt for d/c today. 1101   Pt ambulated with PT in hallway and stairs. Pt was cleared by PT for D/C. Pt was seen by OT. Pt has put on his clothes. Pt medicated with oxycodone 10 mg po for pain level 7/10.    1255   INT removed. D/C instructions given. Dual AVS reviewed with Santos Garcia RN. All medications reviewed individually with patient. Opportunities for questions and concerns provided. Patient discharged  Per wheelchair accompanied by family member. Patient's arm band appropriately discarded.

## 2021-04-22 NOTE — PROGRESS NOTES
Problem: Self Care Deficits Care Plan (Adult)  Goal: *Acute Goals and Plan of Care (Insert Text)  Description: Acute Goals and Plan of Care   Initial OT STGs (4/22/2021) Within 7 days:    1. Patient will perform toilet transfer with supervision in preparation for bowel and bladder management. 2. Patient will perform bowel and bladder management with supervision for increased independence with ADLs. 3. Patient will perform UB dressing with Min A while utilizing valeria-techniques for increased independence with ADLs. 4. Patient will perform LB dressing with supervision while utilizing valeria-techniques for increased independence with ADLs. 5. Patient will perform bathing w/ Min A w/ A/E for increased independence with ADLs. 6. Patient will perform RUE pendulum exercises with Min A for increased independence with ADLs. 7. Patient will utilize energy conservation techniques with 1 verbal cue for increased independence with ADLs. 8. Patient will independently perform RUE AROM elbow distally to maximize functional use of hand for Bilateral ADL tasks while in sling. Outcome: Progressing Towards Goal   OCCUPATIONAL THERAPY EVALUATION/DISCHARGE    Patient: Alexey Christensen (84 y.o. male)  Date: 4/22/2021  Primary Diagnosis: Status post reverse arthroplasty of right shoulder [Z96.611]  Procedure(s) (LRB):  RIGHT REVERSE TOTAL SHOULDER ARTHROPLASTY,TORNIER (Right) 1 Day Post-Op   Precautions:  Fall, Other (comment)(s/p R TSR)  PLOF: Independent with ADL's and transfers, pt is R handed    ASSESSMENT AND RECOMMENDATIONS:  Based on the objective data described below, the patient presents with RUE decreased ROM and strength affecting UE ADLs. Pt received sitting on EOB. Pt rated his pain level a 8/10 at rest. RN notified and she medicated pt during the session. Pt did SPT from bed to chair with supervision to don clothes seated in chair and standing as needed.  Pt did UB dressing with Min A and vc's for technique to don pullover and button up shirt. Patient requires Min A to vanessa/doff sling. He did LB dressing with supervision seated and standing. Patient able to utilize ankle-over-knee technique for sock and shoe donning. Patient reports his wife will be available to assist with ADLs and sling management at [place]. Pt left resting in chair at the end of the session with ice pack applied to R shoulder. OT reviewed pendulum and distal AROM exercises with pt but he declined to complete pendulum exercises with OT due to completing them prior to OT session with PT [facility]. Pt provided with an opportunity to voice questions/concerns on ADL performance when home and he voiced no further concerns. Education: Reviewed home safety, body mechanics, importance of moving every hour to prevent joint stiffness, Pendulum Exercises, roll of ice for edema/pain control, one-handed techniques, proper positioning of sling, and adaptive dressing techniques with patient verbalizing understanding at this time      Skilled Occupational Therapy is not indicated at this time in this setting. Patient should continue to improve as pain and ROM/strength improves. Discharge Recommendations: Home Health with responsible adult care at least 24 hours upon hospital d/c  Further Equipment Recommendations for Discharge: N/A      SUBJECTIVE:   Patient stated my wife can help with this stuff.     OBJECTIVE DATA SUMMARY:     Past Medical History:   Diagnosis Date    Arthritis     Cancer ([address] 2020    prostate     Dry eyes, bilateral     GERD (gastroesophageal reflux disease)     Hypertension     Thyroid disease      Past Surgical History:   Procedure Laterality Date    HX CATARACT REMOVAL Bilateral     HX HEENT  2015    detached retina    HX PROSTATE SURGERY  2020    HX TONSILLECTOMY       Barriers to Learning/Limitations: yes;  physical  Compensate with: visual, verbal, tactile, kinesthetic cues/model    Home Situation/Prior Level of Function:   [address] [facility] Environment: Private residence  # Steps to Enter: 6  Rails to Enter: Yes  Hand Rails : Bilateral(wide)  One/Two Story Residence: Two story  # of Interior Steps: 15  Interior Rails: Right  Lift Chair Available: No  Living Alone: No  Support Systems: Family member(s)  Patient Expects to be Discharged to[de-identified] Private residence  Current DME Used/Available at Home: None  Tub or Shower Type: Shower(with built in seat)  [x]  Right hand dominant   []  Left hand dominant    Cognitive/Behavioral Status:  Neurologic State: Alert  Orientation Level: Oriented X4  Cognition: Follows commands  Safety/Judgement: Awareness of environment    Skin: R shoulder incision w/ Mepilex   Edema: compression hose in place & applied ice     Coordination: BUE  Coordination: Within functional limits  Fine Motor Skills-Upper: Left Intact; Right Intact    Gross Motor Skills-Upper: Left Intact; Right Impaired    Balance:  Sitting: Intact  Standing: Intact    Strength: BUE  Strength: Generally decreased, functional (R shoulder NT)     Tone & Sensation:BUE  Tone: Normal  Sensation: Impaired(R shoulder)     Range of Motion: BUE  AROM: Generally decreased, functional (R shoulder NT)     Functional Mobility and Transfers for ADLs:  Bed Mobility:   Scooting: Supervision  Transfers:  Sit to Stand: Supervision  Bed to Chair: Supervision     ADL Assessment:  Feeding: Independent  Oral Facial Hygiene/Grooming: Supervision  Bathing: Minimum assistance  Upper Body Dressing: Minimum assistance  Lower Body Dressing: Supervision  Toileting: Supervision     ADL Intervention:   Upper Body Dressing Assistance  Dressing Assistance: Minimum assistance  Pullover Shirt: Minimum assistance  Front Opened Shirt: Minimum assistance    Lower Body Dressing Assistance  Dressing Assistance: Supervision  Underpants: Supervision  Pants With Elastic Waist: Supervision  Socks: Supervision  Shoes with Cloth Laces: Supervision  Leg Crossed Method Used: No  Position Performed: Seated in chair;Standing  Cues: Don;Verbal cues provided    Cognitive Retraining  Safety/Judgement: Awareness of environment    Therapeutic Exercise:  Pendulum exercises: OT reviewed them with pt but he declined to complete them with OT due to doing them with PT Daily Morales prior to session    Pain:  Pain level pre-treatment: 8/10  Pain level post-treatment: 8/10  Pain Intervention(s): Medication administer by Nursing (see MAR); Rest, Ice, Repositioning   Response to intervention: Nurse notified, see doc flow sheet    Activity Tolerance:   Fair. Patient able to stand 2 minute(s). Patient able to complete ADLs with intermittent rest breaks. Patient limited by pain, decreased ROM, strength, and sensation. Patient unsteady. Please refer to the flowsheet for vital signs taken during this treatment. After treatment:   [x]  Patient left in no apparent distress sitting up in chair  []  Patient sitting on EOB  []  Patient left in no apparent distress in bed  [x]  Call bell left within reach  [x]  Nursing notified  []  Caregiver present  [x]  Ice applied  []  SCD's on while back in bed  [] Bed alarm activated    COMMUNICATION/EDUCATION:   Communication/Collaboration:  [x]       Role of Occupational Therapy in the acute care setting. [x]      Home safety education was provided and the patient/caregiver indicated understanding. [x]      Patient/family have participated as able in goal setting and plan of care. [x]      Patient/family agree to work toward stated goals and plan of care. []      Patient understands intent and goals of therapy, but is neutral about his/her participation. []      Patient is unable to participate in plan of care at this time. Thank you for this referral.  Roberto Mares OTR/L MOT  Time Calculation: 35 mins    Eval Complexity: History: LOW Complexity : Brief history review ;    Examination: LOW Complexity : 1-3 performance deficits relating to physical, cognitive , or psychosocial skils that result in activity limitations and / or participation restrictions ;    Decision Making:LOW Complexity : No comorbidities that affect functional and no verbal or physical assistance needed to complete eval tasks

## 2021-04-22 NOTE — PROGRESS NOTES
23:55 22:45 Shift assessment completed. See nsg flow sheet for details. 02:45 Reassessed with 0 changes noted. Optifoam dsg on R shoulder remains C/D/I with CMS & RP intact. Ice pack was refilled & re-applied. Resting quietly in bed with eyes closed between cares x for voiding per urinal  w/o difficulty. 07:30 Bedside and Verbal shift change report given to GEORGINA Brewster RN (oncoming nurse) by Kathrene Primrose RN (offgoing nurse). Report included the following information SBAR.

## 2021-04-22 NOTE — OP NOTES
[address] [place] [address], [address] [place] [address] TOTAL SHOULDER REPLACEMENT    Patient: Gregory Sanchez MRN: [id number]  SSN: [id number]    YOB: 1945  Age: 78 y.o. Sex: male      Date of Surgery: 4/21/2021  Preoperative Diagnosis: ROTATOR CUFF TEAR RIGHT SHOULDER   Postoperative Diagnosis: ROTATOR CUFF TEAR RIGHT SHOULDER   Location: [facility]  Surgeon: Jules Vogt MD  Physician Assistant: TAMARA Campos was medically necessary for holding of retractors for exposure and to complete the case. Assistant: Circ-1: Theresa March RN  Circ-Relief: Keena Bonds RN  Physician Assistant: Lesvia Villarreal PA-C  Scrub Tech-1: Marialuisa Eisenberg  Surg Asst-1: Damien WEAVER    Anesthesia: General + regional block + local    Procedure: Total right ReverseShoulder Arthroplasty (CPT: 87703)    Findings: Degenerative joint disease of the right shoulder and rotator cuff arthropathy     Estimated Blood Loss: 200 mL    Fluids: see anesthesia record    Complications: None    Specimens: None    Cultures: None    Antibiotics: 2 g Ancef. Tranexamic Acid: 1 g IV x 2 doses    Diluted Exparel: 20 mL of Exparel (13 mg/mL) mixed with 40 mL Normal Saline    Implants:   Implant Name Type Inv.  Item Serial No.  Lot No. LRB No. Used Action   BASEPLATE RUSSELL [id number] HALF WDG AUG REVERSED AEQUALIS - [id number]  BASEPLATE RUSSELL [id number] HALF WDG AUG REVERSED AEQUALIS [id number] [employer]WD  Right 1 Implanted   SCREW BONE L35MM DIA6.5MM TI ST FULL THRD CTRL FOR RUSSELL - [id number]  SCREW BONE L35MM DIA6.5MM TI ST FULL THRD CTRL FOR RUSSELL  [employer]WD [id number] Right 1 Implanted   SCREW BONE L14MM DIA5MM TI ST FULL THRD PERIPH FOR RUSSELL - [id number]  SCREW BONE L14MM DIA5MM TI ST FULL THRD PERIPH FOR [place] [employer]_WD [id number] Right 1 Implanted   SCREW BONE L30MM DIA5MM TI ST FULL THRD PERIPH FOR RUSSELL - [id number]  SCREW BONE L30MM DIA5MM TI ST FULL THRD PERIPH FOR [employer] [id number] Right 1 Implanted   SCREW BONE L22MM DIA5MM TI ST FULL THRD PERIPH FOR RUSSELL - [id number]  SCREW BONE L22MM DIA5MM TI ST FULL THRD PERIPH FOR [employer] [id number] Right 1 Implanted   SPHERE RUSSELL [id number] STD REVERSED AEQUALIS PERFORMA - [id number]  SPHERE RUSSELL [id number] STD [place] [employer] PERFORMA [id number] [employer]  Right 1 Implanted   SCREW BONE L38MM CECIL 5MM THRD PERIPH FOR [employer] [id number] Right 1 Implanted   TRAY HUM THK+0MM 15MM OFFSET SHLDR LO REVERSED AEQUALIS - [id number]  TRAY HUM THK+0MM 15MM OFFSET SHLDR LO REVERSED AEQUALIS [id number] [employer] N/A Right 1 Implanted   INSERT SHLDR C [id number] THK+6MM 7. 5DEG REVERSED AEQUALIS - [id number]  INSERT SHLDR C [id number] THK+6MM 7. 5DEG REVERSED Seleta Blood [id number] [employer] N/A Right 1 Implanted   STEM HUM L74MM DIA3A 127. 5DEG STD SHLDR PTC AEQUALIS ASCEND - [id number]  STEM HUM L74MM DIA3A 127. 5DEG STD SHLDR PTC Donelda [employer] [id number] [employer] N/A Right 1 Implanted       Patient Condition: Stable. INDICATIONS: This 78y.o.-year-old male has had pain in the right shoulder for over 6 months and has become functionally disabled with respect to the activities of daily living. The pain is increased with activities of daily living. The pain and dysfunction were not releaved by at least three months of conservative therapy such as NSAIDS (ibuprofen, aleve), analgesics (tylenol), structured flexibility and muscle strengthening physical therapy exercises, activity restrictions and injection with corticosteroid. The radiographs showed advanced arthritis with glenohumeral joint space narrowing, subchondral sclerosis, periarticular osteophytes, and proximal humeral migration. A right reverse total shoulder replacement has been recommended.   The risks and the possible complications of this surgery and anesthesia including, but not exclusive to, bleeding, infection, dislocation, fracture, deep venous thrombosis, pulmonary embolus, nerve and vascular injury, possible need for other procedures, and possibly death have been explained to the patient. The patient accepts these risks for the benefits of shoulder replacement over the failure of non-operative care to provide adequate pain relief and improve their functional disability. The patients medical problems have been addressed by their primary care physician and are deemed stable and as well controlled as possible. Inpatient hospital care was medically necessary, reasonable, and appropriate. This is a medically necessary procedure. As such, without a use of a surgical assistant to retract soft tissues, protect vital neuro-vascular structures and assist in the technical aspects of the operation, this procedure would not have been possible. Therefore this assistant was medically necessary. DESCRIPTION OF FINDINGS:  DJD and rotator cuff arthropathy of the right shoulder. DESCRIPTION OF PROCEDURE:    After the risks and benefits of surgery were discussed, informed consent was obtained. The patient was identified in the preoperative holding area and the operative extremity was marked. The patient was taken to the operating room and placed in the supine position. Following a regional brachial plexus block, general anesthesia was performed. IV antibiotics were given. A Ignacio catheter was not placed. After verification of the appropriate operative site from the consent form, with confirmation of surgeon, anesthesia and nursing teams, the patient was positioned in a semi-upright beach-chair position; the bony prominences were well padded; and the right arm was prepped and draped in a sterile fashion using Chlorhexidine, hydrogen peroxide and Chloraprep. A formal timeout was performed.   An anterior skin incision was in line with the deltopectoral interval - beginning near the coracoid and extending distally and laterally. Dissection was made down through the subcutaneous tissue. Dissection continued through the deltopectoral interval. Subdeltoid adhesions were released using blunt dissection. The rotator cuff was found to be deficient which was expected based based on pre-op imaging so the decision was made to procede with a reverse total shoulder arthroplasty. The long head of the biceps tendon was identified in the bicipital groove. It was tagged with a #5 Ethibond and tenotomized for later tenodesis. The subscapularis tendon was identified. The Subscap tendon was elevated from its insertion on the lesser tuberosity. A #5 Ethibond stitch was placed through the subscapularis tendon to be used for retraction during the case. The rotator cuff interval was incised up to the level of the glenoid. The capsule along the inferior neck was released as the shoulder was dislocated anteriorly. The supraspinatus was torn and retracted. Osteophytes were removed. Humeral preparation was begun using intramedullary cutting guide. Humeral head osteotomy was performed just proximal to the level of the cuff insertion posteriorly. Broaching was performed to size 3 at about 35 degrees retroversion. Attention was turned toward glenoid preparation. Retractors were placed to optimize glenoid visualization. The remaining biceps stump was excised. The labrum was excised from 12 oclock - around anteriorly and also the inferior labrum. The posterior labrum was left intact. The glenoid was noted to be worn - worst superiorly. The centerpoint of the glenoid was determined using Blueprint guide and the central guidewire was placed. The reamer for a full wedge baseplate was placed over the guidewire and the glenoid surface was reamed. Trial baseplate was placed. The glenoid was screwed down into place.  Using the [facility] instrumentation, after appropriate drilling, Superior, Inferior Posterior and Anterior screws were placed. Attention returned to completion of humeral preparation. Trialing of the glenosphere and humeral components was performed to optimize range of motion and stability. At 90 degrees of abduction the shoulder could be externally rotated 90 degrees and internally rotated 80 degrees. Trial implants were then removed. A drill hole was placed in the anterolateral proximal humerus for biceps tenodesis and a #5 Ethibond suture was placed through the hole. The real humeral components were impacted into place. Stability and range of motion was again checked and found to be unchanged. The wound was copiously irrigated and hemostasis was achieved. The biceps tendon was tendon was repaired in the groove using #5 Ethibond sutures and tenodesed to the Pec tendon with the repair reinforced with the transosseous Ethibond suture. 30 mL 0.25 Marcaine and 60 mL of diluted Exparel was injected in the periarticular soft tissues. A layered closure of the subcutaneous tissue with 0 Vicryl followed by interrupted 3-0 Vicryl for the deep subcuticular layer followed by running subcuticular skin closure using a 3-0 Monocryl. Prineo and Steri-strips were placed and sterile dressings were applied. The patient was repositioned supine and awakened from anesthesia. All sponge and needle counts were correct.     Annelise Sanchez MD

## 2021-04-22 NOTE — PROGRESS NOTES
Problem: Falls - Risk of  Goal: *Absence of Falls  Description: Document Casey Can Fall Risk and appropriate interventions in the flowsheet.   Outcome: Progressing Towards Goal  Note: Fall Risk Interventions:

## 2021-04-22 NOTE — PROGRESS NOTES
Transition of Care (EFREN) Plan:     Chart reviewed and spoke with pt and spouse about d/c planning, FOC explained, pt selected [facility], address and contact number verified. EFREN Transportation:   How is patient being transported at discharge? Family/Friend      When? Once discharge criteria met     Is transport scheduled? N/A      Follow-up appointment and transportation:   PCP/Specialist?  See AVS for Appointment         Who is transporting to the follow-up appointment? Family/Friend      Is transport for follow up appointment scheduled? N/A    Communication plan (with patient/family): Who is being called? Patient or Next of Kin? Responsible party? Patient      What number(s) is to be used? See Facesheet      What service provider is calling for [facility] services? When are they calling? 24-48 hours following discharge    Readmission Risk? (Green/Low; Yellow/Moderate; Red/High):  Green  Care Management Interventions  PCP Verified by CM: Yes  Palliative Care Criteria Met (RRAT>21 & CHF Dx)?: No  Mode of Transport at Discharge:  Other (see comment)(spouse)  Transition of Care Consult (CM Consult): [address]: No  Reason Outside [place]: Physician referred to specific agency([facility])  Physical Therapy Consult: Yes  Occupational Therapy Consult: Yes  Current Support Network: Lives with Spouse  Confirm Follow Up Transport: Family  The Patient and/or Patient Representative was Provided with a Choice of Provider and Agrees with the Discharge Plan?: Yes  Freedom of Choice List was Provided with Basic Dialogue that Supports the Patient's Individualized Plan of Care/Goals, Treatment Preferences and Shares the Quality Data Associated with the Providers?: Yes  Discharge Location  Discharge Placement: Home with home health

## 2021-04-23 ENCOUNTER — TELEPHONE (OUTPATIENT)
Dept: OTHER | Age: 76
End: 2021-04-23

## 2022-03-18 PROBLEM — Z96.611 STATUS POST REVERSE ARTHROPLASTY OF RIGHT SHOULDER: Status: ACTIVE | Noted: 2021-04-22

## 2022-03-19 PROBLEM — R97.20 ELEVATED PSA: Status: ACTIVE | Noted: 2018-02-20

## 2022-10-27 ENCOUNTER — ANESTHESIA EVENT (OUTPATIENT)
Dept: ENDOSCOPY | Age: 77
End: 2022-10-27
Payer: MEDICARE

## 2022-10-28 ENCOUNTER — ANESTHESIA (OUTPATIENT)
Dept: ENDOSCOPY | Age: 77
End: 2022-10-28
Payer: MEDICARE

## 2022-10-28 ENCOUNTER — HOSPITAL ENCOUNTER (OUTPATIENT)
Age: 77
Setting detail: OUTPATIENT SURGERY
Discharge: HOME | End: 2022-10-28
Attending: SURGERY | Admitting: SURGERY
Payer: MEDICARE

## 2022-10-28 VITALS
OXYGEN SATURATION: 100 % | HEART RATE: 70 BPM | DIASTOLIC BLOOD PRESSURE: 71 MMHG | WEIGHT: 153 LBS | SYSTOLIC BLOOD PRESSURE: 100 MMHG | HEIGHT: 67 IN | TEMPERATURE: 97.5 F | BODY MASS INDEX: 24.01 KG/M2 | RESPIRATION RATE: 16 BRPM

## 2022-10-28 PROCEDURE — 74011250636 HC RX REV CODE- 250/636: Performed by: NURSE ANESTHETIST, CERTIFIED REGISTERED

## 2022-10-28 PROCEDURE — 76060000032 HC ANESTHESIA 0.5 TO 1 HR: Performed by: SURGERY

## 2022-10-28 PROCEDURE — 77030040361 HC SLV COMPR DVT MDII -B: Performed by: SURGERY

## 2022-10-28 PROCEDURE — 76040000007: Performed by: SURGERY

## 2022-10-28 PROCEDURE — 2709999900 HC NON-CHARGEABLE SUPPLY: Performed by: SURGERY

## 2022-10-28 PROCEDURE — 74011250636 HC RX REV CODE- 250/636: Performed by: SURGERY

## 2022-10-28 PROCEDURE — 74011000250 HC RX REV CODE- 250: Performed by: NURSE ANESTHETIST, CERTIFIED REGISTERED

## 2022-10-28 RX ORDER — SODIUM CHLORIDE, SODIUM LACTATE, POTASSIUM CHLORIDE, CALCIUM CHLORIDE 600; 310; 30; 20 MG/100ML; MG/100ML; MG/100ML; MG/100ML
50 INJECTION, SOLUTION INTRAVENOUS CONTINUOUS
Status: CANCELLED | OUTPATIENT
Start: 2022-10-28

## 2022-10-28 RX ORDER — CARBOXYMETHYLCELLULOSE SODIUM 10 MG/ML
1 GEL OPHTHALMIC 2 TIMES DAILY
COMMUNITY

## 2022-10-28 RX ORDER — LIDOCAINE HYDROCHLORIDE 20 MG/ML
INJECTION, SOLUTION EPIDURAL; INFILTRATION; INTRACAUDAL; PERINEURAL AS NEEDED
Status: DISCONTINUED | OUTPATIENT
Start: 2022-10-28 | End: 2022-10-28 | Stop reason: HOSPADM

## 2022-10-28 RX ORDER — PROPOFOL 10 MG/ML
INJECTION, EMULSION INTRAVENOUS AS NEEDED
Status: DISCONTINUED | OUTPATIENT
Start: 2022-10-28 | End: 2022-10-28 | Stop reason: HOSPADM

## 2022-10-28 RX ORDER — LORATADINE 10 MG/1
10 TABLET ORAL DAILY
COMMUNITY

## 2022-10-28 RX ORDER — SODIUM CHLORIDE 9 MG/ML
125 INJECTION, SOLUTION INTRAVENOUS CONTINUOUS
Status: DISCONTINUED | OUTPATIENT
Start: 2022-10-28 | End: 2022-10-28 | Stop reason: HOSPADM

## 2022-10-28 RX ORDER — SILDENAFIL 100 MG/1
TABLET, FILM COATED ORAL
COMMUNITY
Start: 2022-10-01

## 2022-10-28 RX ORDER — NALOXONE HYDROCHLORIDE 0.4 MG/ML
0.1 INJECTION, SOLUTION INTRAMUSCULAR; INTRAVENOUS; SUBCUTANEOUS
Status: CANCELLED | OUTPATIENT
Start: 2022-10-28

## 2022-10-28 RX ORDER — PSEUDOEPHEDRINE HCL 30 MG
60 TABLET ORAL AS NEEDED
COMMUNITY

## 2022-10-28 RX ORDER — ONDANSETRON 2 MG/ML
4 INJECTION INTRAMUSCULAR; INTRAVENOUS ONCE
Status: CANCELLED | OUTPATIENT
Start: 2022-10-28 | End: 2022-10-28

## 2022-10-28 RX ORDER — OXYCODONE AND ACETAMINOPHEN 5; 325 MG/1; MG/1
1 TABLET ORAL AS NEEDED
Status: CANCELLED | OUTPATIENT
Start: 2022-10-28

## 2022-10-28 RX ORDER — DEXTROMETHORPHAN HYDROBROMIDE, GUAIFENESIN 5; 100 MG/5ML; MG/5ML
650 LIQUID ORAL EVERY 8 HOURS
COMMUNITY

## 2022-10-28 RX ORDER — MINERAL OIL, PETROLATUM 425; 568 MG/G; MG/G
OINTMENT OPHTHALMIC
COMMUNITY

## 2022-10-28 RX ORDER — ALPHA-D-GALACTOSIDASE 400 UNIT
2 TABLET ORAL AS NEEDED
COMMUNITY

## 2022-10-28 RX ADMIN — SODIUM CHLORIDE 125 ML/HR: 9 INJECTION, SOLUTION INTRAVENOUS at 11:11

## 2022-10-28 RX ADMIN — PROPOFOL 20 MG: 10 INJECTION, EMULSION INTRAVENOUS at 12:23

## 2022-10-28 RX ADMIN — PROPOFOL 40 MG: 10 INJECTION, EMULSION INTRAVENOUS at 12:10

## 2022-10-28 RX ADMIN — LIDOCAINE HYDROCHLORIDE 50 MG: 20 INJECTION, SOLUTION INTRAVENOUS at 12:03

## 2022-10-28 RX ADMIN — PROPOFOL 60 MG: 10 INJECTION, EMULSION INTRAVENOUS at 12:04

## 2022-10-28 RX ADMIN — PROPOFOL 20 MG: 10 INJECTION, EMULSION INTRAVENOUS at 12:21

## 2022-10-28 RX ADMIN — PROPOFOL 20 MG: 10 INJECTION, EMULSION INTRAVENOUS at 12:16

## 2022-10-28 RX ADMIN — PROPOFOL 30 MG: 10 INJECTION, EMULSION INTRAVENOUS at 12:27

## 2022-10-28 RX ADMIN — PROPOFOL 20 MG: 10 INJECTION, EMULSION INTRAVENOUS at 12:19

## 2022-10-28 RX ADMIN — PROPOFOL 40 MG: 10 INJECTION, EMULSION INTRAVENOUS at 12:06

## 2022-10-28 NOTE — H&P
Assessment/Plan  # Detail Type Description    1. Assessment Encounter for other preprocedural examination (Z01.818). Impression Patient is asymptomatic and due for a screening colonoscopy. Here for pre-procedure evaluation for medical issues which may interfere with colonoscopy success as well as clearance for anesthesia. Patient Plan Pt is an average risk patient presenting for colonoscopy and colon cancer screening. Pt risk was determined based on NCCN guidelines V1.2016. Risks benefits and alternatives of colonoscopy were discussed to include FOBT, barium enema, CT virtual colonoscopy. Risk of perforation, bleeding or gas pain, missed polyps, inability to complete full colonoscopy, anesthesia complications discussed. Expectations of preoperative bowel prep /procedure described. Literature was given to the patient and reviewed. The patient showed understanding and agreed with the plan as above. Extended time was taken to answer all patient questions. Extra pre-procedure paperwork and counseling completed. Bowel prep prescription and directions provided. Pt has been educated on the bowel prep. Pt has been instructed that he is not to eat legumes, nuts, seeds or vegetables that are high in cellulose one week prior to the planned procedure. Pt should avoid red, purple and orange liquids, epi, and juices. Pt is to be on a clear liquid diet 24 hours prior to the procedure. Pt will start the prep at 1800 hours the night prior to the procedure with the second portion of the prep to be taken 6 hours prior to time of the appointment. 2. Assessment Personal history of colonic polyps (Z86.010). This 78year old male presents for Colon Cancer Screen and Hx polyp/colon cancer. History of Present Illness  1. Colon Cancer Screen   Prior screening:  colonoscopy. Denies risk factors.   Pertinent negatives include abdominal pain, change in bowel habits, change in stool caliber, constipation, decreased appetite, diarrhea, melena, nausea, rectal bleeding, vomiting, weight gain and weight loss. Additional information: No family history of colon cancer, No family history of Crohn's/colitis and NSAID/ASA use. 2.  Hx polyp/colon cancer   history of polyps in 2018 with Dr. Tiff Mercer            Problem List  Problem Description Onset Date Chronic Clinical Status Notes   Hypoparathyroidism  Y     Hypertension  Y     High cholesterol  Y     Arthritis  Y     Dry eyes  Y     Sciatica  Y     Abnormal EKG  N         Medications (active prior to today)  Medication Instructions Start Date Stop Date Refilled Elsewhere   Restasis 0.05 % eye drops in a dropperette instill 1 drop by ophthalmic route  every 12 hours into affected eye(s) 09/21/2017   Y   Nature's Tears 0.1 %-0.3 % eye drops use as directed 12/14/2017   N   Gas-X  04/14/2021   Y   Voltaren 1 % topical gel apply (2G)  by topical route 4 times every day to the affected area(s) 06/29/2021 06/29/2021 N   EpiPen 2-Stephan 0.3 mg/0.3 mL injection, auto-injector inject 0.3 milliliter by intramuscular route once as needed for anaphylaxis; go to ER if used 12/14/2021 12/14/2021 N   sildenafil 100 mg tablet take 1/2 to 1 tablet by oral route  every day as needed approximately 1 hour before sexual activity 03/02/2022 03/02/2022 N   tadalafil 5 mg tablet take 1 tablet by oral route  every day 03/31/2022 03/31/2022 N   tizanidine 2 mg tablet take 1 tablet by oral route  every 12 hours as needed not to exceed 3 doses in 24 hours 06/11/2022 06/11/2022 N   atorvastatin 40 mg tablet take 1 tablet by oral route  every day 06/11/2022 06/11/2022 N   Allerclear 10 mg tablet take 1 tablet by oral route  every day 06/22/2022   N   acetaminophen  mg tablet,extended release take 1 Tablet by oral route  every 8 hours as needed swallowing whole with water.  Do not break, crush, dissolve and/or chew. 06/22/2022   N   Metamucil 3.4 gram/5.4 gram oral powder  06/22/2022   N NeilMed NasaFlo packet with sinus rinse device  06/22/2022   N   Stool Softener 50 mg capsule take 1 capsule by oral route  every day at bedtime as needed 06/22/2022   N   Flomax 0.4 mg capsule take 1 capsule by oral route  every day 1/2 hour following the same meal each day 06/22/2022   N   amlodipine 10 mg tablet take 1 tablet by oral route  every day 06/24/2022 06/24/2022 N   losartan 100 mg tablet take 1 tablet by oral route  every day 06/24/2022 06/24/2022 N   omeprazole 40 mg capsule,delayed release take 1 capsule by oral route  every day before a meal 06/24/2022 06/24/2022 N   chlorthalidone 25 mg tablet take 0.5 tablet by oral route  every day 07/09/2022 07/02/2023 07/09/2022 N         Allergies  Ingredient Reaction (Severity) Medication Name Comment   BEE STING KIT Tachycardia; Difficulty breathing         Review of Systems  System Neg/Pos Details   Constitutional Negative Chills, Fever, Night sweats, Weight gain and Weight loss. ENMT Negative Ear drainage and Hearing loss. Eyes Negative Eye pain and Vision changes. Respiratory Negative Cough, Dyspnea, Known TB exposure and Wheezing. Cardio Negative Chest pain and Claudication. GI Negative Abdominal pain, Blood in stool, Change in bowel habits, Change in stool caliber, Constipation, Decreased appetite, Diarrhea, Heartburn, Melena, Nausea, Rectal bleeding and Vomiting.  Negative Dysuria and Hematuria. Endocrine Negative Cold intolerance and Heat intolerance. Neuro Negative Dizziness, Headache and Seizures. Psych Negative Insomnia and Impaired judgement. Integumentary Negative Change in shape/size of mole(s), Hair loss and Skin lesion. MS Negative Joint swelling and Muscle weakness. Hema/Lymph Negative Easy bleeding and Easy bruising. Allergic/Immuno Negative Contact allergy and Environmental allergies. Physical Exam  Exam Findings Details   Constitutional Normal Well developed.    Eyes Normal Conjunctiva - Right: Normal, Left: Normal. Pupil - Right: Normal, Left: Normal.   Ears Normal Inspection - Right: Normal, Left: Normal.   Nose/Mouth/Throat Normal External nose - Normal. Lips/teeth/gums - Normal.   Neck Exam Normal Inspection - Normal. Thyroid gland - Normal.   Respiratory Normal Inspection - Normal. Auscultation - Normal. Effort - Normal.   Cardiovascular Normal Regular rate and rhythm. No murmurs, gallops, or rubs. Vascular Normal Pulses - Dorsalis pedis: Normal. Capillary refill - Less than 2 seconds. Abdomen Normal Inspection - Normal. Auscultation - Normal. No abdominal tenderness. No hepatic enlargement. No spleen enlargement. No hernia. Rectal Normal Fecal occult blood test - Not indicated. Skin Normal Inspection - Normal.   Musculoskeletal Normal Visual overview of all four extremities is normal.   Extremity Normal No edema. Neurological Normal Memory - Normal. Cranial nerves - Cranial nerves II through XII grossly intact. Psychiatric Normal Orientation - Oriented to time, place, person & situation. Appropriate mood and affect. Normal insight. Normal judgment. Immunizations Entered by History  Date Immunization   7/7/2022 12:00:00 AM SARS-COV-2 (COVID-19) vaccine, mRNA, spike protein, LNP, preservative free, 100 mcg/0.5mL dose or 50 mcg/0.25mL dose   7/7/2022 12:00:00 AM Tdap (Adacel)   10/26/2021 12:00:00 AM SARS-COV-2 (COVID-19) vaccine, mRNA, spike protein, LNP, preservative free, 100 mcg or 50 mcg dose   10/8/2021 12:00:00 AM influenza virus vaccine, split virus (incl.  purified surface antigen)-retired CODE   3/11/2021 12:00:00 AM SARS-COV-2 (COVID-19) vaccine, mRNA, spike protein, LNP, preservative free, 100 mcg/0.5mL dose   3/1/2020 12:00:00 AM Shingrix   1/1/2020 12:00:00 AM Shingrix   1/1/2012 12:00:00 AM Tdap (Adacel)   10/8/2012 12:00:00 AM pneumococcal polysaccharide vaccine, 23 valent   6/15/2015 12:00:00 AM Pneumococcal conjugate PCV 13   10/5/2020 12:00:00 AM influenza, high-dose seasonal, quadrivalent, . 7mL dose, preservative free   2/11/2021 12:00:00 AM SARS-COV-2 (COVID-19) vaccine, mRNA, spike protein, LNP, preservative free, 100 mcg/0.5mL dose         Medications (added, continued, or stopped this visit)  Start Date Medication Directions PRN Status PRN Reason Instruction Stop Date   06/22/2022 acetaminophen  mg tablet,extended release take 1 Tablet by oral route  every 8 hours as needed swallowing whole with water. Do not break, crush, dissolve and/or chew.  N      06/22/2022 Allerclear 10 mg tablet take 1 tablet by oral route  every day N      06/24/2022 amlodipine 10 mg tablet take 1 tablet by oral route  every day N      06/11/2022 atorvastatin 40 mg tablet take 1 tablet by oral route  every day N      07/09/2022 chlorthalidone 25 mg tablet take 0.5 tablet by oral route  every day N   07/02/2023 12/14/2021 EpiPen 2-Stephan 0.3 mg/0.3 mL injection, auto-injector inject 0.3 milliliter by intramuscular route once as needed for anaphylaxis; go to ER if used N      06/22/2022 Flomax 0.4 mg capsule take 1 capsule by oral route  every day 1/2 hour following the same meal each day N      04/14/2021 Gas-X  N      06/24/2022 losartan 100 mg tablet take 1 tablet by oral route  every day N      06/22/2022 Metamucil 3.4 gram/5.4 gram oral powder  N      12/14/2017 Nature's Tears 0.1 %-0.3 % eye drops use as directed N      06/22/2022 NeilMed NasaFlo packet with sinus rinse device  N      06/24/2022 omeprazole 40 mg capsule,delayed release take 1 capsule by oral route  every day before a meal N      09/21/2017 Restasis 0.05 % eye drops in a dropperette instill 1 drop by ophthalmic route  every 12 hours into affected eye(s) N      03/02/2022 sildenafil 100 mg tablet take 1/2 to 1 tablet by oral route  every day as needed approximately 1 hour before sexual activity N      06/22/2022 Stool Softener 50 mg capsule take 1 capsule by oral route  every day at bedtime as needed N      03/31/2022 tadalafil 5 mg tablet take 1 tablet by oral route  every day N      06/11/2022 tizanidine 2 mg tablet take 1 tablet by oral route  every 12 hours as needed not to exceed 3 doses in 24 hours N      06/29/2021 Voltaren 1 % topical gel apply (2G)  by topical route 4 times every day to the affected area(s) N        Active Patient Care Team Members  Name Contact Agency Type Support Role Relationship Active Date Inactive Date [facility]   Emergency Contact Spouse      Ifeanyichukwu Lisai   encounter provider    Urology   Esvin Mensah   Patient provider PCP   Internal Medicine

## 2022-10-28 NOTE — PERIOP NOTES
Dr Amanda Lopez at bedside talking to patient and his wife, pictures and postop note are given to patient.

## 2022-10-28 NOTE — PROCEDURES
Colonoscopy Procedure Note    Indications: screening for colorectal cancer    Surgeon(s): Surgeon(s) and Role:     Angelo Harris DO [facility]    Assistant(s): Endoscopy Technician-1: Tara Negron CNA  Endoscopy RN-1: Sarahi Lamar RN; Jerryl Holter, RN    Anesthesia: MAC     Pre-Procedure Exam:  Airway: clear   Heart: normal S1and S2    Lungs: clear bilateral  Abdomen: soft, nontender, bowel sounds present and normal in all quadrants   Mental Status: awake, alert, and oriented to person, place, and time      Procedure in Detail:  Informed consent was obtained for the procedure, including sedation. Risks of perforation, hemorrhage, adverse drug reaction, and aspiration were discussed. The patient was placed in the left lateral decubitus position. Based on the pre-procedure assessment, including review of the patient's medical history, medications, allergies, and review of systems, he had been deemed to be an appropriate candidate for moderate sedation; he was therefore sedated with the medications listed above. The patient was monitored continuously with ECG tracing, pulse oximetry, blood pressure monitoring, and direct observations. A digital rectal examination was performed. The [id number] was inserted into the rectum and advanced under direct vision to the cecum, which was identified by the ileocecal valve and appendiceal orifice. The quality of the colonic preparation was good. A careful inspection of the mucosa was made as the colonoscope was withdrawn, including a careful and complete straight view of the rectum and anorectal junction; findings and interventions are described below. Appropriate photodocumentation was obtained.     Findings: severe diverticulosis throughout  Rectum: Normal  Sigmoid: Normal, narrowing and stiffness from severe diverticulosis causing limited views  Descending Colon: Normal  Transverse Colon: Normal  Ascending Colon: Normal  Cecum: Normal  Terminal Ileum: Not entered    Specimens: No specimens were collected. EBL: None    Complications: None; patient tolerated the procedure well. Recommendations:   - For colon cancer screening in this average-risk patient, colonoscopy may be repeated in 5 years - Due to limited views in the sigmoid from stiffening and narrowing from severe diverticulosis    - Follow up with primary care physician. Signed By: DR. Sandra Ivey, [address] [place] [address] Colorectal Surgery                       10/28/2022

## 2022-10-28 NOTE — PERIOP NOTES
TRANSFER - IN REPORT:    Verbal report received from [place] RN(name) on Elizabeth Dennis  being received from procedure room(unit) for routine progression of care      Report consisted of patients Situation, Background, Assessment and   Recommendations(SBAR). Information from the following report(s) SBAR, Procedure Summary, Intake/Output, MAR, and Recent Results was reviewed with the receiving nurse. Opportunity for questions and clarification was provided. Assessment completed upon patients arrival to unit and care assumed.

## 2022-10-28 NOTE — ANESTHESIA POSTPROCEDURE EVALUATION
Post-Anesthesia Evaluation and Assessment    Cardiovascular Function/Vital Signs  Visit Vitals  /65 (BP 1 Location: Left upper arm, BP Patient Position: At rest;Lying left side)   Pulse 72   Temp 36.4 °C (97.5 °F)   Resp 16   Ht 5' 7\" (1.702 m)   Wt 69.4 kg (153 lb)   SpO2 100%   BMI 23.96 kg/m²       Patient is status post Procedure(s):  COLONOSCOPY. Nausea/Vomiting: Controlled. Postoperative hydration reviewed and adequate. Pain:  Pain Scale 1: Numeric (0 - 10) (10/28/22 1240)  Pain Intensity 1: 0 (10/28/22 1240)   Managed. Neurological Status: At baseline. Mental Status and Level of Consciousness: Arousable. Pulmonary Status:   O2 Device: None (Room air) (10/28/22 1240)   Adequate oxygenation and airway patent. Complications related to anesthesia: None    Post-anesthesia assessment completed. No concerns. Patient has met all discharge requirements.     Signed By: Yoel Elder CRNA    October 28, 2022

## 2022-10-28 NOTE — DISCHARGE INSTRUCTIONS
Marco Vizcaino  [id number]  1945    COLON DISCHARGE INSTRUCTIONS    Discomfort:  Redness at IV site- apply warm compress to area; if redness or soreness persist- contact your physician  There may be a slight amount of blood passed from the rectum  Gaseous discomfort- walking, belching will help relieve any discomfort  You should not operate a vehicle for 12 hours  You should not engage in an occupation involving machinery or appliances for rest of today  You may not drink alcoholic beverages for at least 12 hours  Avoid making any critical decisions for at least 24 hour  DIET:   Regular or resume diet normally appropriate for you. - however -  remember your colon is empty and a heavy meal will produce gas. Avoid these foods:  vegetables, fried / greasy foods, carbonated drinks for today     ACTIVITY:  You may resume your normal daily activities it is recommended that you spend the remainder of the day resting -  avoid any strenuous activity. CALL M.D. ANY SIGN OF:   Increasing pain, nausea, vomiting  Abdominal distension (swelling)  New increased bleeding (oral or rectal)  Fever (chills)  Pain in chest area  Bloody discharge from nose or mouth  Shortness of breath      Follow-up Instructions:   If biopsies were taken, please call Dr. Nataliia Aquino office in next 2 weeks, [phone]. Otherwise follow up with your PCM. Marco Vizcaino  [id number]  1945        DISCHARGE SUMMARY from Nurse    The following personal items collected during your admission are returned to you:   Dental Appliance: Dental Appliances: At home  Vision: Visual Aid: Glasses, At bedside, With patient (reading)  Hearing Aid:    Jewelry:    Clothing:    Other Valuables:    Valuables sent to safe: Misael Gutierrez   DISCHARGE SUMMARY from Nurse    PATIENT INSTRUCTIONS:    After general anesthesia or intravenous sedation, for 24 hours or while taking prescription Narcotics:  Limit your activities  Do not drive and operate hazardous machinery  Do not make important personal or business decisions  Do  not drink alcoholic beverages  If you have not urinated within 8 hours after discharge, please contact your surgeon on call. Report the following to your surgeon:  Excessive pain, swelling, redness or odor of or around the surgical area  Temperature over 100.5  Nausea and vomiting lasting longer than 4 hours or if unable to take medications  Any signs of decreased circulation or nerve impairment to extremity: change in color, persistent  numbness, tingling, coldness or increase pain  Any questions    What to do at Home:  Recommended activity: Activity as tolerated and no driving for today. If you experience any of the following symptoms fever, chills, bleeding, uncontrolled pain, nausea, vomiting, please follow up with Dr Beatris Kilpatrick. *  Please give a list of your current medications to your Primary Care Provider. *  Please update this list whenever your medications are discontinued, doses are      changed, or new medications (including over-the-counter products) are added. *  Please carry medication information at all times in case of emergency situations. These are general instructions for a healthy lifestyle:    No smoking/ No tobacco products/ Avoid exposure to second hand smoke  Surgeon General's Warning:  Quitting smoking now greatly reduces serious risk to your health. Obesity, smoking, and sedentary lifestyle greatly increases your risk for illness    A healthy diet, regular physical exercise & weight monitoring are important for maintaining a healthy lifestyle    You may be retaining fluid if you have a history of heart failure or if you experience any of the following symptoms:  Weight gain of 3 pounds or more overnight or 5 pounds in a week, increased swelling in our hands or feet or shortness of breath while lying flat in bed.   Please call your doctor as soon as you notice any of these symptoms; do not wait until your next office visit. The discharge information has been reviewed with the patient and caregiver. The patient and caregiver verbalized understanding. Discharge medications reviewed with the patient and caregiver and appropriate educational materials and side effects teaching were provided.     Patient armband removed and shredded    ___________________________________________________________________________________________________________________________________

## 2022-10-28 NOTE — ANESTHESIA PREPROCEDURE EVALUATION
Relevant Problems   No relevant active problems       Anesthetic History   No history of anesthetic complications            Review of Systems / Medical History  Patient summary reviewed, nursing notes reviewed and pertinent labs reviewed    Pulmonary  Within defined limits                 Neuro/Psych   Within defined limits           Cardiovascular    Hypertension          Hyperlipidemia    Exercise tolerance: >4 METS     GI/Hepatic/Renal  Within defined limits           Pertinent negatives: No GERD   Endo/Other        Arthritis  Pertinent negatives: No hypothyroidism   Other Findings              Physical Exam    Airway  Mallampati: II  TM Distance: 4 - 6 cm  Neck ROM: normal range of motion   Mouth opening: Normal     Cardiovascular               Dental  No notable dental hx       Pulmonary                 Abdominal         Other Findings            Anesthetic Plan    ASA: 2  Anesthesia type: MAC          Induction: Intravenous  Anesthetic plan and risks discussed with: Patient

## 2025-03-27 ENCOUNTER — HOSPITAL ENCOUNTER (OUTPATIENT)
Facility: HOSPITAL | Age: 80
Setting detail: RECURRING SERIES
Discharge: HOME | End: 2025-03-30
Payer: MEDICARE

## 2025-03-27 PROCEDURE — 97535 SELF CARE MNGMENT TRAINING: CPT

## 2025-03-27 PROCEDURE — 97110 THERAPEUTIC EXERCISES: CPT

## 2025-03-27 PROCEDURE — 97161 PT EVAL LOW COMPLEX 20 MIN: CPT

## 2025-03-27 NOTE — PROGRESS NOTES
PHYSICAL THERAPY Upper Extremity Evaluation/Daily Note       Patient  verified yes     Visit #   Current / Total 1 24   Time   In / Out 3:35 pm 4:10 pm   Pain   In / Out 2 2   Subjective Functional Status/Changes: Pt is a 80 year old male presenting with c/o left shoulder pain post left reverse TSA with onset DOS 25. 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 Notes he still has trouble reaching up and lifting the arm. States he is unaware of any current remaining precautions for his left shoulder other than limited to no more than 10 lbs.    Changes to:  Meds, Allergies, Med Hx, Sx Hx?  If yes, update Summary List no       “If an interpreting service was utilized for treatment of this patient, the contents of this document represent the material reviewed with the patient via the .”    TREATMENT AREA =  Aftercare following joint replacement surgery [Z47.1]  Presence of left artificial shoulder joint [Z96.612]    SUBJECTIVE  PLOF: functionally independent, no AD, semi-active lifestyle  Limitations to PLOF: limited ease of reaching and lifting with left arm  Mechanism of Injury: s/p elective reverse TSA  Current symptoms/Complaints: 2/10 at best with rest; 8/10 at worst with reaching and lifting the left arm; pt reports they are able to sleep through the night secondary to pain  Previous Treatment/Compliance: HHPT since surgery within days of surgery  Imaging/Diagnostic Testing: n/a  PMHx/Surgical Hx: right knee OA, drained baker's  cyst and steroid injection 3/27/25 to right knee, right shoulder reverse TSA in , Cataract surgeries for B eyes, Right torn retina, Prostate cancer proton therapy in the past , 6 month checkup doing well, HTN (well controlled), high cholesterol (medicated)  Work Hx: see intake  Living Situation:   Pt Goals: \"Improved movement and eventually less pain post therapy.\"  Barriers: []pain []financial []time []transportation

## 2025-03-27 NOTE — PROGRESS NOTES
[facility]  [address] [address]  Phone: [phone]   Fax: [phone]    Plan of Care/ Statement of Necessity for Physical Therapy Services        Patient name: Lupe Rothman Start of Care: 3/27/2025   Referral source: Dannie James MD : 1945    Medical Diagnosis: Aftercare following joint replacement surgery  Presence of left artificial shoulder joint  Pain in left shoulder Onset Date:25    Treatment Diagnosis:  M25.512  LEFT SHOULDER PAIN                                          Prior Hospitalization: see medical history Provider#: [id number]     Comorbidities: Musculoskeletal disorders and Other:  right knee OA, drained baker's  cyst and steroid injection 3/27/25 to right knee, right shoulder reverse TSA in , Cataract surgeries for B eyes, Right torn retina, Prostate cancer proton therapy in the past , 6 month checkup doing well, HTN (well controlled), high cholesterol (medicated)    Prior Level of Function: : functionally independent, no AD, semi-active lifestyle       The Plan of Care and following information is based on the information from the initial evaluation.  Assessment/ key information: Pt is a 79 yo male who presents to [facility] with c/o left shoulder pain, weakness, and limited overhead reaching performance. Patient is s/p left reverse TSA on 25 with associated weakness, limited shoulder active and passive ROM with soft vs empty end feels. Pt will benefit from skilled PT to address their impairments and facilitate improved functional status.     Evaluation Complexity:  History:  MEDIUM  Complexity : 1-2 comorbidities / personal factors will impact the outcome/ POC ; Examination:  MEDIUM Complexity : 3 Standardized tests and measures addressin body structure, function, activity limitation and / or participation in recreation  ;Presentation:  LOW Complexity : Stable, uncomplicated  ;Clinical Decision Making:

## 2025-03-28 ENCOUNTER — HOSPITAL ENCOUNTER (OUTPATIENT)
Facility: HOSPITAL | Age: 80
Setting detail: RECURRING SERIES
Discharge: HOME | End: 2025-03-31
Payer: MEDICARE

## 2025-03-28 ENCOUNTER — TELEPHONE (OUTPATIENT)
Facility: HOSPITAL | Age: 80
End: 2025-03-28

## 2025-03-28 PROCEDURE — 97530 THERAPEUTIC ACTIVITIES: CPT

## 2025-03-28 PROCEDURE — 97110 THERAPEUTIC EXERCISES: CPT

## 2025-03-28 PROCEDURE — 97016 VASOPNEUMATIC DEVICE THERAPY: CPT

## 2025-03-28 PROCEDURE — 97140 MANUAL THERAPY 1/> REGIONS: CPT

## 2025-03-28 NOTE — TELEPHONE ENCOUNTER
Called referring MD for POST-OP report, precautions & protocol. Provided fax # & nurse said she or someone from Dr. James' team will fax over once found in chart.

## 2025-03-28 NOTE — PROGRESS NOTES
PHYSICAL / OCCUPATIONAL THERAPY - DAILY TREATMENT NOTE     Patient Name: Lupe Rothman    Date: 3/28/2025    : 1945  Insurance: Payor: MEDICARE / Plan: MEDICARE PART A AND B / Product Type: *No Product type* /      Patient  verified Yes     Visit #   Current / Total 2 24   Time   In / Out 2:50 pm 3:50 pm   Pain   In / Out 1 1   Subjective Functional Status/Changes: \"Its a little sore but not bad.\" Pt notes he has some soreness at night when he lays down.   Changes to:  Allergies, Med Hx, Sx Hx?   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     OBJECTIVE    Modalities Rationale:     decrease edema, decrease inflammation, and decrease pain to improve patient's ability to progress to PLOF and address remaining functional goals.     min [] Estim Unattended, type/location:                                      []  w/ice    []  w/heat    min [] Estim Attended, type/location:                                     []  w/US     []  w/ice    []  w/heat    []  TENS insruct      min []  Mechanical Traction: type/lbs                   []  pro   []  sup   []  int   []  cont    []  before manual    []  after manual    min []  Ultrasound, settings/location:      min []  Iontophoresis w/ dexamethasone, location:                                               []  take home patch       []  in clinic        min  unbilled []  Ice     []  Heat    location/position:     min []  Paraffin,  details:    10 min [x]  Vasopneumatic Device, press/temp: left shoulder in seated post treatment     min []  Whirlpool / Fluido:    If using vaso (only need to measure limb vaso being performed on)      pre-treatment girth : 46      post-treatment

## 2025-04-01 ENCOUNTER — HOSPITAL ENCOUNTER (OUTPATIENT)
Facility: HOSPITAL | Age: 80
Setting detail: RECURRING SERIES
Discharge: HOME | End: 2025-04-04
Payer: MEDICARE

## 2025-04-01 PROCEDURE — 97110 THERAPEUTIC EXERCISES: CPT

## 2025-04-01 PROCEDURE — 97112 NEUROMUSCULAR REEDUCATION: CPT

## 2025-04-01 PROCEDURE — 97530 THERAPEUTIC ACTIVITIES: CPT

## 2025-04-01 PROCEDURE — 97140 MANUAL THERAPY 1/> REGIONS: CPT

## 2025-04-01 NOTE — PROGRESS NOTES
PHYSICAL / OCCUPATIONAL THERAPY - DAILY TREATMENT NOTE     Patient Name: Lupe Rothman    Date: 2025    : 1945  Insurance: Payor: MEDICARE / Plan: MEDICARE PART A AND B / Product Type: *No Product type* /      Patient  verified Yes     Visit #   Current / Total 3 24   Time   In / Out 10:50 am 11:40 am   Pain   In / Out 1 3   Subjective Functional Status/Changes: \"Its just a little sore.\"   Changes to:  Allergies, Med Hx, Sx Hx?   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     OBJECTIVE    PD modalities    Therapeutic Procedures:  Tx Min Billable or 1:1 Min (if diff from Tx Min) Procedure, Rationale, Specifics   30  83447 Therapeutic Exercise (timed):  increase ROM, strength, coordination, balance, and proprioception to improve patient's ability to progress to PLOF and address remaining functional goals. (see flow sheet as applicable)    Details if applicable:       10  11063 Therapeutic Activity (timed):  use of dynamic activities replicating functional movements to increase ROM, strength, coordination, balance, and proprioception in order to improve patient's ability to progress to PLOF and address remaining functional goals.  (see flow sheet as applicable)    Details if applicable:     10  39943 Manual Therapy (timed):  decrease pain, increase ROM, and increase tissue extensibility to improve patient's ability to progress to PLOF and address remaining functional goals.  The manual therapy interventions were performed at a separate and distinct time from the therapeutic activities interventions . Details: left shoulder PROM flex, scap, abd, ER in scaption plane @ 45 degrees abd to 30 degrees ER void of

## 2025-04-04 ENCOUNTER — HOSPITAL ENCOUNTER (OUTPATIENT)
Facility: HOSPITAL | Age: 80
Setting detail: RECURRING SERIES
Discharge: HOME | End: 2025-04-07
Payer: MEDICARE

## 2025-04-04 PROCEDURE — 97016 VASOPNEUMATIC DEVICE THERAPY: CPT

## 2025-04-04 PROCEDURE — 97530 THERAPEUTIC ACTIVITIES: CPT

## 2025-04-04 PROCEDURE — 97140 MANUAL THERAPY 1/> REGIONS: CPT

## 2025-04-04 PROCEDURE — 97110 THERAPEUTIC EXERCISES: CPT

## 2025-04-04 NOTE — PROGRESS NOTES
4/28/2025 10:50 AM Christopher Villafana, PT [facility]   5/1/2025 10:10 AM Christopher Villafana, PT [facility]   5/5/2025 10:10 AM Christopher Villafana, PT [facility]   5/8/2025 10:50 AM Christopher Villafana, PT [facility]   5/12/2025 10:10 AM Christopher Villafana, PT [facility]   5/15/2025  9:30 AM Christopher Villafana, PT [facility]

## 2025-04-07 ENCOUNTER — HOSPITAL ENCOUNTER (OUTPATIENT)
Facility: HOSPITAL | Age: 80
Setting detail: RECURRING SERIES
Discharge: HOME | End: 2025-04-10
Payer: MEDICARE

## 2025-04-07 PROCEDURE — 97112 NEUROMUSCULAR REEDUCATION: CPT

## 2025-04-07 PROCEDURE — 97110 THERAPEUTIC EXERCISES: CPT

## 2025-04-07 PROCEDURE — 97530 THERAPEUTIC ACTIVITIES: CPT

## 2025-04-07 NOTE — PROGRESS NOTES
demonstrate ABD PROM to 130 to aid in functional mechanics for overhead reach..  Eval Status: 100     Long Term Goals: To be accomplished in 24 treatments:  Patient will improve QDI score by 19 points to indicate significant improvement in functional status.  Eval Status: QDI 29.5  Current: 45.45% 4/7/25     Pt will demonstrate left shoulder flexion AROM  140 or better to aid in functional mechanics for ADLs.  Eval Status: 65     Pt will have 4/5 left shoulder flexion strength to return to goals of improve ease of overhead lifting tasks..  Eval Status: 2+/5    PLAN  Yes  Continue plan of care  []  Upgrade activities as tolerated  []  Discharge due to :  []  Other:    Aj Stein PTA    4/7/2025    9:48 AM    Future Appointments   Date Time Provider Department Center   4/7/2025 12:10 PM Aj Stein PTA [facility]   4/10/2025  1:30 PM Christopher Villafana, PT [facility]   4/14/2025 11:30 AM Christopher Villafana, PT [facility]   4/17/2025 10:10 AM Christopher Villafana, PT [facility]   4/21/2025 11:30 AM Christopher Villafana, PT [facility]   4/24/2025  2:10 PM Christopher Villafana, PT [facility]   4/28/2025 10:50 AM Christopher Villafana, PT [facility]   5/1/2025 10:10 AM Christopher Villafana, PT [facility]   5/5/2025 10:10 AM Christopher Villafana, PT [facility]   5/8/2025 10:50 AM Christopher Villafana, PT [facility]   5/12/2025 10:10 AM Christopher Villafana, PT [facility]   5/15/2025  9:30 AM Christopher Villafana, PT [facility]

## 2025-04-10 ENCOUNTER — HOSPITAL ENCOUNTER (OUTPATIENT)
Facility: HOSPITAL | Age: 80
Setting detail: RECURRING SERIES
Discharge: HOME | End: 2025-04-13
Payer: MEDICARE

## 2025-04-10 PROCEDURE — 97112 NEUROMUSCULAR REEDUCATION: CPT

## 2025-04-10 PROCEDURE — 97110 THERAPEUTIC EXERCISES: CPT

## 2025-04-10 PROCEDURE — 97140 MANUAL THERAPY 1/> REGIONS: CPT

## 2025-04-10 PROCEDURE — 97530 THERAPEUTIC ACTIVITIES: CPT

## 2025-04-10 NOTE — PROGRESS NOTES
PHYSICAL / OCCUPATIONAL THERAPY - DAILY TREATMENT NOTE     Patient Name: Lupe Rothman    Date: 4/10/2025    : 1945  Insurance: Payor: MEDICARE / Plan: MEDICARE PART A AND B / Product Type: *No Product type* /      Patient  verified Yes     Visit #   Current / Total 6 24   Time   In / Out 1:30 pm 2:30 pm   Pain   In / Out 1 0-1   Subjective Functional Status/Changes: Pt reports his shoulder has been doing well.   Changes to:  Allergies, Med Hx, Sx Hx?   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     OBJECTIVE    Therapeutic Procedures:  Tx Min Billable or 1:1 Min (if diff from Tx Min) Procedure, Rationale, Specifics   19 14 10034 Therapeutic Exercise (timed):  increase ROM, strength, coordination, balance, and proprioception to improve patient's ability to progress to PLOF and address remaining functional goals. (see flow sheet as applicable)    Details if applicable:       11 8 24385 Neuromuscular Re-Education (timed):  improve balance, coordination, kinesthetic sense, posture, core stability and proprioception to improve patient's ability to develop conscious control of individual muscles and awareness of position of extremities in order to progress to PLOF and address remaining functional goals. (see flow sheet as applicable)    Details if applicable:     12 10 22873 Therapeutic Activity (timed):  use of dynamic activities replicating functional movements to increase ROM, strength, coordination, balance, and proprioception in order to improve patient's ability to progress to PLOF and address remaining functional goals.  (see flow sheet as applicable)     Details if applicable:     8  14899 Manual Therapy

## 2025-04-14 ENCOUNTER — HOSPITAL ENCOUNTER (OUTPATIENT)
Facility: HOSPITAL | Age: 80
Setting detail: RECURRING SERIES
Discharge: HOME | End: 2025-04-17
Payer: MEDICARE

## 2025-04-14 PROCEDURE — 97530 THERAPEUTIC ACTIVITIES: CPT

## 2025-04-14 PROCEDURE — 97112 NEUROMUSCULAR REEDUCATION: CPT

## 2025-04-14 PROCEDURE — 97110 THERAPEUTIC EXERCISES: CPT

## 2025-04-14 PROCEDURE — 97140 MANUAL THERAPY 1/> REGIONS: CPT

## 2025-04-14 NOTE — PROGRESS NOTES
Review HEP    [] Progressed/Changed HEP, detail:    [] Other detail:       Objective Information/Functional Measures/Assessment    Shoulder PROM  Left  Flexion 127    ER WFL, not objectively measured, no pain or end feel limitations with performance through partial range    Pt tolerated well continuing to progress well with shoulder PROM and AAROM with minimal discomfort with end range PROM flexion. Occasional cues for technique. Continues to demonstrate some limitations in terminal shoulder PROM though is improving as above. Pt left in no apparent distress.    Patient will continue to benefit from skilled PT / OT services to modify and progress therapeutic interventions, analyze and address functional mobility deficits, analyze and address ROM deficits, analyze and address strength deficits, analyze and address soft tissue restrictions, analyze and cue for proper movement patterns, and instruct in home and community integration to address functional deficits and attain remaining goals.    Progress toward goals / Updated goals:  []  See Progress Note/Recertification    Short Term Goals: To be accomplished in 8 treatments:  Patient will be independent and compliant with HEP to progress toward goals and restore functional mobility.   Eval Status: issued at [facility]  Current: pt reports HEP performance 4/1/25     Patient will demonstrate left shoulder flexion PROM to 140 to improve ease of OH reach.   Eval Status: 115  Current: 125 4/10/25 127 degrees with soft end feel and minimal discomfort at end range 4/14/25     Pt will demonstrate ABD PROM to 130 to aid in functional mechanics for overhead reach..  Eval Status: 100  Current: 112 soft end feel 4/14/25     Long Term Goals: To be accomplished in 24 treatments:  Patient will improve QDI score by 19 points to indicate significant improvement in functional status.  Eval Status: QDI 29.5  Current: 45.45% 4/7/25     Pt will demonstrate left shoulder flexion AROM  140

## 2025-04-17 ENCOUNTER — HOSPITAL ENCOUNTER (OUTPATIENT)
Facility: HOSPITAL | Age: 80
Setting detail: RECURRING SERIES
Discharge: HOME | End: 2025-04-20
Payer: MEDICARE

## 2025-04-17 PROCEDURE — 97112 NEUROMUSCULAR REEDUCATION: CPT

## 2025-04-17 PROCEDURE — 97016 VASOPNEUMATIC DEVICE THERAPY: CPT

## 2025-04-17 PROCEDURE — 97110 THERAPEUTIC EXERCISES: CPT

## 2025-04-17 PROCEDURE — 97140 MANUAL THERAPY 1/> REGIONS: CPT

## 2025-04-17 NOTE — PROGRESS NOTES
PHYSICAL / OCCUPATIONAL THERAPY - DAILY TREATMENT NOTE     Patient Name: Lupe Rothman    Date: 2025    : 1945  Insurance: Payor: MEDICARE / Plan: MEDICARE PART A AND B / Product Type: *No Product type* /      Patient  verified Yes     Visit #   Current / Total 7 24   Time   In / Out 10:09 am 11:11 am   Pain   In / Out 2-2.5/10 0   Subjective Functional Status/Changes: Pt reports his shoulder was a little sore from the side to side wallslides the other day.   Changes to:  Allergies, Med Hx, Sx Hx?   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     OBJECTIVE    Modalities Rationale:     decrease edema, decrease inflammation, and decrease pain to improve patient's ability to progress to PLOF and address remaining functional goals.     min [] Estim Unattended, type/location:                                      []  w/ice    []  w/heat    min [] Estim Attended, type/location:                                     []  w/US     []  w/ice    []  w/heat    []  TENS insruct      min []  Mechanical Traction: type/lbs                   []  pro   []  sup   []  int   []  cont    []  before manual    []  after manual    min []  Ultrasound, settings/location:     10 min  unbill [x]  Ice     []  Heat    location/position: left shoulder in seated post treatment     min []  Paraffin,  details:     min []  Vasopneumatic Device, press/temp:     min []  Whirlpool / Fluido:    If using vaso (only need to measure limb vaso being performed on)      pre-treatment girth :       post-treatment girth :       measured at (landmark location) :      min []  Other:    Skin assessment post-treatment:   Intact       Therapeutic

## 2025-04-21 ENCOUNTER — APPOINTMENT (OUTPATIENT)
Facility: HOSPITAL | Age: 80
End: 2025-04-21
Payer: MEDICARE

## 2025-04-24 ENCOUNTER — HOSPITAL ENCOUNTER (OUTPATIENT)
Facility: HOSPITAL | Age: 80
Setting detail: RECURRING SERIES
Discharge: HOME | End: 2025-04-27
Payer: MEDICARE

## 2025-04-24 PROCEDURE — 97530 THERAPEUTIC ACTIVITIES: CPT

## 2025-04-24 PROCEDURE — 97110 THERAPEUTIC EXERCISES: CPT

## 2025-04-24 PROCEDURE — 97112 NEUROMUSCULAR REEDUCATION: CPT

## 2025-04-24 PROCEDURE — 97016 VASOPNEUMATIC DEVICE THERAPY: CPT

## 2025-04-24 NOTE — PROGRESS NOTES
cleared for progression to strengthening.    PLAN  Yes  Continue plan of care  []  Upgrade activities as tolerated  []  Discharge due to :  []  Other:    Christopher Villafana PT    4/24/2025    5:16 PM    Future Appointments   Date Time Provider Department Center   4/28/2025 10:50 AM Aj Stein PTA [facility]   5/1/2025 10:10 AM Christopher Villafana, PT [facility]   5/5/2025 10:10 AM Aj Stein PTA [facility]   5/8/2025 10:50 AM Christopher Villafana, PT [facility]   5/12/2025 10:10 AM Christopher Villafana, PT [facility]   5/15/2025  9:30 AM Aj Stein PTA [facility]

## 2025-04-28 ENCOUNTER — HOSPITAL ENCOUNTER (OUTPATIENT)
Facility: HOSPITAL | Age: 80
Setting detail: RECURRING SERIES
Discharge: HOME | End: 2025-05-01
Payer: MEDICARE

## 2025-04-28 PROCEDURE — 97530 THERAPEUTIC ACTIVITIES: CPT

## 2025-04-28 PROCEDURE — 97016 VASOPNEUMATIC DEVICE THERAPY: CPT

## 2025-04-28 PROCEDURE — 97110 THERAPEUTIC EXERCISES: CPT

## 2025-04-28 PROCEDURE — 97112 NEUROMUSCULAR REEDUCATION: CPT

## 2025-04-28 NOTE — PROGRESS NOTES
PHYSICAL / OCCUPATIONAL THERAPY - DAILY TREATMENT NOTE     Patient Name: Lupe Rothman    Date: 2025    : 1945  Insurance: Payor: MEDICARE / Plan: MEDICARE PART A AND B / Product Type: *No Product type* /      Patient  verified Yes     Visit #   Current / Total 10 24   Time   In / Out 10:50 11:40   Pain   In / Out 1 1   Subjective Functional Status/Changes: Pt reports mild tenderness in anterior aspect of left shoulder from yard over the weekend    Changes to:  Allergies, Med Hx, Sx Hx?   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     OBJECTIVE    Modalities Rationale:     decrease edema, decrease inflammation, and decrease pain to improve patient's ability to progress to PLOF and address remaining functional goals.    10 min [x]  Vasopneumatic Device, press/temp: Low pressure, low temp   If using vaso (only need to measure limb vaso being performed on)      pre-treatment girth : 45 cm      post-treatment girth : 44 cm      measured at (landmark location) :  [place]    Skin assessment post-treatment (if applicable):    [x]  intact    []  redness- no adverse reaction                 []redness - adverse reaction:         Therapeutic Procedures:  Tx Min Billable or 1:1 Min (if diff from Tx Min) Procedure, Rationale, Specifics   20  58315 Therapeutic Exercise (timed):  increase ROM, strength, coordination, balance, and proprioception to improve patient's ability to progress to PLOF and address remaining functional goals. (see flow sheet as applicable)    Details if applicable:       10  02584 Neuromuscular Re-Education (timed):  improve balance, coordination, kinesthetic sense, posture, core stability and

## 2025-04-28 NOTE — PROGRESS NOTES
[facility]  [address] [address]                                                                                      Phone: [phone]   Fax: [phone]    Progress Note  Patient name: Lupe Rothman Start of Care: 3/27/2025   Referral source: Dannie James MD : 1945   Medical/Treatment Diagnosis: Aftercare following joint replacement surgery  Presence of left artificial shoulder joint  Pain in left shoulder Onset Date:25     Prior Hospitalization: see medical history Provider#: [id number]   Comorbidities:  Musculoskeletal disorders and Other:  right knee OA, drained baker's  cyst and steroid injection 3/27/25 to right knee, right shoulder reverse TSA in , Cataract surgeries for B eyes, Right torn retina, Prostate cancer proton therapy in the past , 6 month checkup doing well, HTN (well controlled), high cholesterol (medicated)   Prior Level of Function:functionally independent, no AD, semi-active lifestyle     Reporting Period: 3/27/2025-2025    Visits from Start of Care: 10    Missed Visits: 0    Updated Goals/Measure of Progress:   Short Term Goals: To be accomplished in 8 treatments:  Patient will be independent and compliant with HEP to progress toward goals and restore functional mobility.   Eval Status: issued at [facility]  PN: Pt performs HEP at least 2-3x/week Progression 25     Patient will demonstrate left shoulder flexion PROM to 140 to improve ease of OH reach.   Eval Status: 115  PN: 135 Progression 25     Pt will demonstrate ABD PROM to 130 to aid in functional mechanics for overhead reach..  Eval Status: 100  PN: 113 Progression 25     Long Term Goals: To be accomplished in 24 treatments:  Patient will improve QDI score by 19 points to indicate significant improvement in functional status.  Eval Status: QDI 29.5  PN: 34.09% Regression 25     Pt will demonstrate left shoulder flexion AROM  140 or

## 2025-05-01 ENCOUNTER — HOSPITAL ENCOUNTER (OUTPATIENT)
Facility: HOSPITAL | Age: 80
Setting detail: RECURRING SERIES
Discharge: HOME | End: 2025-05-04
Payer: MEDICARE

## 2025-05-01 PROCEDURE — 97530 THERAPEUTIC ACTIVITIES: CPT

## 2025-05-01 PROCEDURE — 97140 MANUAL THERAPY 1/> REGIONS: CPT

## 2025-05-01 PROCEDURE — 97112 NEUROMUSCULAR REEDUCATION: CPT

## 2025-05-01 PROCEDURE — 97110 THERAPEUTIC EXERCISES: CPT

## 2025-05-01 NOTE — PROGRESS NOTES
73245 Manual Therapy (timed):  decrease pain, increase ROM, and increase tissue extensibility to improve patient's ability to progress to PLOF and address remaining functional goals.  The manual therapy interventions were performed at a separate and distinct time from the therapeutic activities interventions . Details: left shoulder PROM, gentle stretching into shoulder flexion and abd sub pain threshold    Details if applicable:            Details if applicable:     69  [facility] Totals Reminder: bill using total billable min of TIMED therapeutic procedures (example: do not include dry needle or estim unattended, both untimed codes, in totals to left)  8-22 min = 1 unit; 23-37 min = 2 units; 38-52 min = 3 units; 53-67 min = 4 units; 68-82 min = 5 units   Total Total     TOTAL TREATMENT TIME:        69     Charge Capture    [x]  Patient Education billed concurrently with other procedures   [x] Review HEP    [] Progressed/Changed HEP, detail:    [] Other detail:       Objective Information/Functional Measures/Assessment    Pt tolerated treatment well. Demonstrates some limitations in shoulder flexion and abd PROM/AROM with soft end feel with some discomfort if stretching too firmly into those directions. Trialled manual low load stretching with good tolerance. Progressing with AAROM/AROM with some left scapular hike with assisted shoulder flexion, otherwise progressing well. Progressed some low load strengthening as per flow sheet. Cues for technique particularly with shoulder resisted ER and shoulder flexion ROM. Pt noted muscle soreness post treatment but no significant change in pain and left in no apparent distress.    Patient will continue to benefit from skilled PT / OT services to modify and progress therapeutic interventions, analyze and address functional mobility deficits, analyze and address ROM deficits, analyze and address strength deficits, analyze and address soft tissue restrictions, analyze and cue for

## 2025-05-05 ENCOUNTER — HOSPITAL ENCOUNTER (OUTPATIENT)
Facility: HOSPITAL | Age: 80
Setting detail: RECURRING SERIES
Discharge: HOME | End: 2025-05-08
Payer: MEDICARE

## 2025-05-05 PROCEDURE — 97110 THERAPEUTIC EXERCISES: CPT

## 2025-05-05 PROCEDURE — 97530 THERAPEUTIC ACTIVITIES: CPT

## 2025-05-05 PROCEDURE — 97112 NEUROMUSCULAR REEDUCATION: CPT

## 2025-05-05 NOTE — PROGRESS NOTES
PHYSICAL / OCCUPATIONAL THERAPY - DAILY TREATMENT NOTE     Patient Name: Lupe Rothman    Date: 2025    : 1945  Insurance: Payor: MEDICARE / Plan: MEDICARE PART A AND B / Product Type: *No Product type* /      Patient  verified Yes     Visit #   Current / Total 12 24   Time   In / Out 10:10 10:50   Pain   In / Out 2 1.5   Subjective Functional Status/Changes: Pt reports no new complaints   Changes to:  Allergies, Med Hx, Sx Hx?   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     OBJECTIVE    Therapeutic Procedures:  Tx Min Billable or 1:1 Min (if diff from Tx Min) Procedure, Rationale, Specifics   18  77671 Therapeutic Exercise (timed):  increase ROM, strength, coordination, balance, and proprioception to improve patient's ability to progress to PLOF and address remaining functional goals. (see flow sheet as applicable)    Details if applicable:       11  77967 Neuromuscular Re-Education (timed):  improve balance, coordination, kinesthetic sense, posture, core stability and proprioception to improve patient's ability to develop conscious control of individual muscles and awareness of position of extremities in order to progress to PLOF and address remaining functional goals. (see flow sheet as applicable)    Details if applicable:     11  62265 Therapeutic Activity (timed):  use of dynamic activities replicating functional movements to increase ROM, strength, coordination, balance, and proprioception in order to improve patient's ability to progress to PLOF and address remaining functional goals.  (see flow sheet as applicable)     Details if applicable:     40  [facility] Totals Reminder: bill using total

## 2025-05-08 ENCOUNTER — HOSPITAL ENCOUNTER (OUTPATIENT)
Facility: HOSPITAL | Age: 80
Setting detail: RECURRING SERIES
Discharge: HOME | End: 2025-05-11
Payer: MEDICARE

## 2025-05-08 PROCEDURE — 97530 THERAPEUTIC ACTIVITIES: CPT

## 2025-05-08 PROCEDURE — 97112 NEUROMUSCULAR REEDUCATION: CPT

## 2025-05-08 PROCEDURE — 97110 THERAPEUTIC EXERCISES: CPT

## 2025-05-08 NOTE — PROGRESS NOTES
PHYSICAL / OCCUPATIONAL THERAPY - DAILY TREATMENT NOTE     Patient Name: Lupe Rothman    Date: 2025    : 1945  Insurance: Payor: MEDICARE / Plan: MEDICARE PART A AND B / Product Type: *No Product type* /      Patient  verified Yes     Visit #   Current / Total 13 24   Time   In / Out 10:51 am 11:40 am   Pain   In / Out 1 0   Subjective Functional Status/Changes: Pt reports he awoke last night due to some ant shoulder pain. Notes he does not remember anything that caused it. States it is not bothering him much now.   Changes to:  Allergies, Med Hx, Sx Hx?   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     OBJECTIVE    Therapeutic Procedures:  Tx Min Billable or 1:1 Min (if diff from Tx Min) Procedure, Rationale, Specifics   25 16 04902 Therapeutic Exercise (timed):  increase ROM, strength, coordination, balance, and proprioception to improve patient's ability to progress to PLOF and address remaining functional goals. (see flow sheet as applicable)    Details if applicable:       12  03483 Neuromuscular Re-Education (timed):  improve balance, coordination, kinesthetic sense, posture, core stability and proprioception to improve patient's ability to develop conscious control of individual muscles and awareness of position of extremities in order to progress to PLOF and address remaining functional goals. (see flow sheet as applicable)    Details if applicable:     12  39221 Therapeutic Activity (timed):  use of dynamic activities replicating functional movements to increase ROM, strength, coordination, balance, and proprioception in order to improve patient's ability to progress to PLOF and address remaining

## 2025-05-12 ENCOUNTER — HOSPITAL ENCOUNTER (OUTPATIENT)
Facility: HOSPITAL | Age: 80
Setting detail: RECURRING SERIES
Discharge: HOME | End: 2025-05-15
Payer: MEDICARE

## 2025-05-12 PROCEDURE — 97140 MANUAL THERAPY 1/> REGIONS: CPT

## 2025-05-12 PROCEDURE — 97110 THERAPEUTIC EXERCISES: CPT

## 2025-05-12 PROCEDURE — 97112 NEUROMUSCULAR REEDUCATION: CPT

## 2025-05-12 NOTE — PROGRESS NOTES
PHYSICAL / OCCUPATIONAL THERAPY - DAILY TREATMENT NOTE     Patient Name: Lupe Rothman    Date: 2025    : 1945  Insurance: Payor: MEDICARE / Plan: MEDICARE PART A AND B / Product Type: *No Product type* /      Patient  verified Yes     Visit #   Current / Total 14 24   Time   In / Out 10:13 am 11:16 am   Pain   In / Out 1 0   Subjective Functional Status/Changes: Pt reports he feels discomfort but not really pain. Otherwise, no complaints.   Changes to:  Allergies, Med Hx, Sx Hx?   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     OBJECTIVE      Therapeutic Procedures:  Tx Min Billable or 1:1 Min (if diff from Tx Min) Procedure, Rationale, Specifics   37 15 10453 Therapeutic Exercise (timed):  increase ROM, strength, coordination, balance, and proprioception to improve patient's ability to progress to PLOF and address remaining functional goals. (see flow sheet as applicable)    Details if applicable:       10 10 87058 Neuromuscular Re-Education (timed):  improve balance, coordination, kinesthetic sense, posture, core stability and proprioception to improve patient's ability to develop conscious control of individual muscles and awareness of position of extremities in order to progress to PLOF and address remaining functional goals. (see flow sheet as applicable)    Details if applicable:     8 8 51480 Therapeutic Activity (timed):  use of dynamic activities replicating functional movements to increase ROM, strength, coordination, balance, and proprioception in order to improve patient's ability to progress to PLOF and address remaining functional goals.  (see flow sheet as applicable)     Details if

## 2025-05-15 ENCOUNTER — HOSPITAL ENCOUNTER (OUTPATIENT)
Facility: HOSPITAL | Age: 80
Setting detail: RECURRING SERIES
Discharge: HOME | End: 2025-05-18
Payer: MEDICARE

## 2025-05-15 PROCEDURE — 97112 NEUROMUSCULAR REEDUCATION: CPT

## 2025-05-15 PROCEDURE — 97110 THERAPEUTIC EXERCISES: CPT

## 2025-05-15 PROCEDURE — 97530 THERAPEUTIC ACTIVITIES: CPT

## 2025-05-15 NOTE — PROGRESS NOTES
PHYSICAL / OCCUPATIONAL THERAPY - DAILY TREATMENT NOTE     Patient Name: Lupe Rothman    Date: 5/15/2025    : 1945  Insurance: Payor: MEDICARE / Plan: MEDICARE PART A AND B / Product Type: *No Product type* /      Patient  verified Yes     Visit #   Current / Total 15 24   Time   In / Out 9:30 10:12   Pain   In / Out 1 0   Subjective Functional Status/Changes: Just a little achy from the rain this morning   Changes to:  Allergies, Med Hx, Sx Hx?   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     OBJECTIVE    Therapeutic Procedures:  Tx Min Billable or 1:1 Min (if diff from Tx Min) Procedure, Rationale, Specifics   19  56213 Therapeutic Exercise (timed):  increase ROM, strength, coordination, balance, and proprioception to improve patient's ability to progress to PLOF and address remaining functional goals. (see flow sheet as applicable)    Details if applicable:       11  86967 Neuromuscular Re-Education (timed):  improve balance, coordination, kinesthetic sense, posture, core stability and proprioception to improve patient's ability to develop conscious control of individual muscles and awareness of position of extremities in order to progress to PLOF and address remaining functional goals. (see flow sheet as applicable)    Details if applicable:     12  85747 Therapeutic Activity (timed):  use of dynamic activities replicating functional movements to increase ROM, strength, coordination, balance, and proprioception in order to improve patient's ability to progress to PLOF and address remaining functional goals.  (see flow sheet as applicable)     Details if applicable:     42  [facility] Totals Reminder: bill

## 2025-05-19 ENCOUNTER — HOSPITAL ENCOUNTER (OUTPATIENT)
Facility: HOSPITAL | Age: 80
Setting detail: RECURRING SERIES
Discharge: HOME | End: 2025-05-22
Payer: MEDICARE

## 2025-05-19 PROCEDURE — 97112 NEUROMUSCULAR REEDUCATION: CPT

## 2025-05-19 PROCEDURE — 97110 THERAPEUTIC EXERCISES: CPT

## 2025-05-19 PROCEDURE — 97530 THERAPEUTIC ACTIVITIES: CPT

## 2025-05-19 NOTE — PROGRESS NOTES
[facility]  [address] [address]                                                                                      Phone: [phone]   Fax: [phone]    Progress Note  Patient name: Lupe Rothman Start of Care: 3/27/2025   Referral source: Dannie James MD : 1945   Medical/Treatment Diagnosis: Aftercare following joint replacement surgery  Presence of left artificial shoulder joint  Pain in left shoulder Onset Date:25     Prior Hospitalization: see medical history Provider#: [id number]   Comorbidities: Musculoskeletal disorders and Other:  right knee OA, drained baker's  cyst and steroid injection 3/27/25 to right knee, right shoulder reverse TSA in , Cataract surgeries for B eyes, Right torn retina, Prostate cancer proton therapy in the past , 6 month checkup doing well, HTN (well controlled), high cholesterol (medicated)   Prior Level of Function::functionally independent, no AD, semi-active lifestyle     Reporting Period: 2025-2025    Visits from Start of Care: 16    Missed Visits: 0    Updated Goals/Measure of Progress:     Short Term Goals: To be accomplished in 8 treatments:  Patient will be independent and compliant with HEP to progress toward goals and restore functional mobility.   Eval Status: issued at [facility]  Current: Pt performs HEP at least 2-3x/week No changes from previous PN 25        Patient will demonstrate left shoulder flexion PROM to 140 to improve ease of OH reach.   Eval Status: 115  Current: 137 almost met 25     Pt will demonstrate ABD PROM to 130 to aid in functional mechanics for overhead reach..  Eval Status: 100  Current: 116 with 1-2/10 pain noted Progression 25     Long Term Goals: To be accomplished in 24 treatments:  Patient will improve QDI score by 19 points to indicate significant improvement in functional status.  Eval Status: QDI 29.5  Current: 20.45% Progression

## 2025-05-19 NOTE — PROGRESS NOTES
PHYSICAL / OCCUPATIONAL THERAPY - DAILY TREATMENT NOTE     Patient Name: Lupe Rothman    Date: 2025    : 1945  Insurance: Payor: MEDICARE / Plan: MEDICARE PART A AND B / Product Type: *No Product type* /      Patient  verified Yes     Visit #   Current / Total 16 24   Time   In / Out 9:30 10:10   Pain   In / Out 0-1 0   Subjective Functional Status/Changes: I still have a little bit of pain with lifting overhead and behind my back   Changes to:  Allergies, Med Hx, Sx Hx?   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     OBJECTIVE    Therapeutic Procedures:  Tx Min Billable or 1:1 Min (if diff from Tx Min) Procedure, Rationale, Specifics   19  02774 Therapeutic Exercise (timed):  increase ROM, strength, coordination, balance, and proprioception to improve patient's ability to progress to PLOF and address remaining functional goals. (see flow sheet as applicable)    Details if applicable:       10  10567 Neuromuscular Re-Education (timed):  improve balance, coordination, kinesthetic sense, posture, core stability and proprioception to improve patient's ability to develop conscious control of individual muscles and awareness of position of extremities in order to progress to PLOF and address remaining functional goals. (see flow sheet as applicable)    Details if applicable:     11  40874 Therapeutic Activity (timed):  use of dynamic activities replicating functional movements to increase ROM, strength, coordination, balance, and proprioception in order to improve patient's ability to progress to PLOF and address remaining functional goals.  (see flow sheet as applicable)     Details if applicable:     40

## 2025-05-29 ENCOUNTER — HOSPITAL ENCOUNTER (OUTPATIENT)
Facility: HOSPITAL | Age: 80
Setting detail: RECURRING SERIES
End: 2025-05-29
Payer: MEDICARE

## 2025-05-29 PROCEDURE — 97530 THERAPEUTIC ACTIVITIES: CPT

## 2025-05-29 PROCEDURE — 97112 NEUROMUSCULAR REEDUCATION: CPT

## 2025-05-29 PROCEDURE — 97110 THERAPEUTIC EXERCISES: CPT

## 2025-05-29 NOTE — PROGRESS NOTES
PHYSICAL / OCCUPATIONAL THERAPY - DAILY TREATMENT NOTE     Patient Name: Lupe Rothman    Date: 2025    : 1945  Insurance: Payor: MEDICARE / Plan: MEDICARE PART A AND B / Product Type: *No Product type* /      Patient  verified Yes     Visit #   Current / Total 17 24   Time   In / Out 0930 1010   Pain   In / Out 1 0   Subjective Functional Status/Changes: 'I still have tightness when reaching over my head and my sleep quality isn't where I want it to be.\"   Changes to:  Allergies, Med Hx, Sx Hx?   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     OBJECTIVE    Therapeutic Procedures:  Tx Min Billable or 1:1 Min (if diff from Tx Min) Procedure, Rationale, Specifics   22  77090 Therapeutic Exercise (timed):  increase ROM, strength, coordination, balance, and proprioception to improve patient's ability to progress to PLOF and address remaining functional goals. (see flow sheet as applicable)    Details if applicable:       8  18437 Neuromuscular Re-Education (timed):  improve balance, coordination, kinesthetic sense, posture, core stability and proprioception to improve patient's ability to develop conscious control of individual muscles and awareness of position of extremities in order to progress to PLOF and address remaining functional goals. (see flow sheet as applicable)    Details if applicable:     10  83240 Therapeutic Activity (timed):  use of dynamic activities replicating functional movements to increase ROM, strength, coordination, balance, and proprioception in order to improve patient's ability to progress to PLOF and address remaining functional goals.  (see flow sheet as applicable)     Details

## 2025-06-02 ENCOUNTER — HOSPITAL ENCOUNTER (OUTPATIENT)
Facility: HOSPITAL | Age: 80
Setting detail: RECURRING SERIES
Discharge: HOME | End: 2025-06-05
Payer: MEDICARE

## 2025-06-02 PROCEDURE — 97112 NEUROMUSCULAR REEDUCATION: CPT

## 2025-06-02 PROCEDURE — 97530 THERAPEUTIC ACTIVITIES: CPT

## 2025-06-02 PROCEDURE — 97110 THERAPEUTIC EXERCISES: CPT

## 2025-06-02 NOTE — PROGRESS NOTES
PHYSICAL / OCCUPATIONAL THERAPY - DAILY TREATMENT NOTE     Patient Name: Lupe Rothman    Date: 2025    : 1945  Insurance: Payor: MEDICARE / Plan: MEDICARE PART A AND B / Product Type: *No Product type* /      Patient  verified Yes     Visit #   Current / Total 18 24   Time   In / Out 930 1010   Pain   In / Out 0 0   Subjective Functional Status/Changes: Pt denied shoulder pain upon arrival but noted LBP due to increased activity over weekend   Changes to:  Allergies, Med Hx, Sx Hx?   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     OBJECTIVE  Therapeutic Procedures:  Tx Min Billable or 1:1 Min (if diff from Tx Min) Procedure, Rationale, Specifics   20  62649 Therapeutic Exercise (timed):  increase ROM, strength, coordination, balance, and proprioception to improve patient's ability to progress to PLOF and address remaining functional goals. (see flow sheet as applicable)    Details if applicable:       10  16026 Neuromuscular Re-Education (timed):  improve balance, coordination, kinesthetic sense, posture, core stability and proprioception to improve patient's ability to develop conscious control of individual muscles and awareness of position of extremities in order to progress to PLOF and address remaining functional goals. (see flow sheet as applicable)    Details if applicable:     10  34686 Therapeutic Activity (timed):  use of dynamic activities replicating functional movements to increase ROM, strength, coordination, balance, and proprioception in order to improve patient's ability to progress to PLOF and address remaining functional goals.  (see flow sheet as applicable)     Details if applicable:

## 2025-06-05 ENCOUNTER — HOSPITAL ENCOUNTER (OUTPATIENT)
Facility: HOSPITAL | Age: 80
Setting detail: RECURRING SERIES
Discharge: HOME | End: 2025-06-08

## 2025-06-05 PROCEDURE — 97110 THERAPEUTIC EXERCISES: CPT

## 2025-06-05 PROCEDURE — 97530 THERAPEUTIC ACTIVITIES: CPT

## 2025-06-05 PROCEDURE — 97112 NEUROMUSCULAR REEDUCATION: CPT

## 2025-06-05 NOTE — PROGRESS NOTES
QDI 29.5  Current: 20.45% Progression 5/19/25     Pt will demonstrate left shoulder flexion AROM  140 or better to aid in functional mechanics for ADLs.  Eval Status: 65  Current: 132 degrees Progression 6/2/25     Pt will have 4/5 left shoulder flexion strength to return to goals of improve ease of overhead lifting tasks..  Eval Status: 2+/5  Current: 3+/5 Progression 6/5/25    PLAN  Yes  Continue plan of care  []  Upgrade activities as tolerated  []  Discharge due to :  []  Other:    Mireya Miller PTA    6/5/2025    8:57 AM    Future Appointments   Date Time Provider Department Center   6/5/2025  9:30 AM Mireya Miller PTA [facility]   6/9/2025  9:30 AM Christopher Villafana, PT [facility]   6/12/2025  9:30 AM Mireya Miller PTA [facility]   6/16/2025  9:30 AM Mireya Miller, PTA [facility]   6/19/2025  9:30 AM Aj Stein, PTA [facility]   6/23/2025  9:30 AM Aj Stein, PTA [facility]   6/26/2025  9:30 AM Dannie Batista, PT [facility]

## 2025-06-09 ENCOUNTER — APPOINTMENT (OUTPATIENT)
Facility: HOSPITAL | Age: 80
End: 2025-06-09
Payer: MEDICARE

## 2025-06-12 ENCOUNTER — HOSPITAL ENCOUNTER (OUTPATIENT)
Facility: HOSPITAL | Age: 80
Setting detail: RECURRING SERIES
Discharge: HOME | End: 2025-06-15
Payer: MEDICARE

## 2025-06-12 PROCEDURE — 97112 NEUROMUSCULAR REEDUCATION: CPT

## 2025-06-12 PROCEDURE — 97110 THERAPEUTIC EXERCISES: CPT

## 2025-06-12 PROCEDURE — 97530 THERAPEUTIC ACTIVITIES: CPT

## 2025-06-12 NOTE — PROGRESS NOTES
PHYSICAL / OCCUPATIONAL THERAPY - DAILY TREATMENT NOTE     Patient Name: Lupe Rothman    Date: 2025    : 1945  Insurance: Payor: MEDICARE / Plan: MEDICARE PART A AND B / Product Type: *No Product type* /      Patient  verified Yes     Visit #   Current / Total 20 24   Time   In / Out 930 1012   Pain   In / Out 1 1   Subjective Functional Status/Changes: \"I am having pretty bad back pain still.  Its been bad for the last 2 weeks\"   Changes to:  Allergies, Med Hx, Sx Hx?   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     OBJECTIVE  Therapeutic Procedures:  Tx Min Billable or 1:1 Min (if diff from Tx Min) Procedure, Rationale, Specifics   22 18 91901 Therapeutic Exercise (timed):  increase ROM, strength, coordination, balance, and proprioception to improve patient's ability to progress to PLOF and address remaining functional goals. (see flow sheet as applicable)    Details if applicable:       10 10 99254 Neuromuscular Re-Education (timed):  improve balance, coordination, kinesthetic sense, posture, core stability and proprioception to improve patient's ability to develop conscious control of individual muscles and awareness of position of extremities in order to progress to PLOF and address remaining functional goals. (see flow sheet as applicable)    Details if applicable:     10 10 85270 Therapeutic Activity (timed):  use of dynamic activities replicating functional movements to increase ROM, strength, coordination, balance, and proprioception in order to improve patient's ability to progress to PLOF and address remaining functional goals.  (see flow sheet as applicable)     Details if applicable:

## 2025-06-16 ENCOUNTER — HOSPITAL ENCOUNTER (OUTPATIENT)
Facility: HOSPITAL | Age: 80
Setting detail: RECURRING SERIES
Discharge: HOME | End: 2025-06-19
Payer: MEDICARE

## 2025-06-16 PROCEDURE — 97530 THERAPEUTIC ACTIVITIES: CPT

## 2025-06-16 PROCEDURE — 97110 THERAPEUTIC EXERCISES: CPT

## 2025-06-16 PROCEDURE — 97112 NEUROMUSCULAR REEDUCATION: CPT

## 2025-06-16 NOTE — PROGRESS NOTES
PHYSICAL / OCCUPATIONAL THERAPY - DAILY TREATMENT NOTE     Patient Name: Lupe Rothman    Date: 2025    : 1945  Insurance: Payor: MEDICARE / Plan: MEDICARE PART A AND B / Product Type: *No Product type* /      Patient  verified Yes     Visit #   Current / Total 21 24   Time   In / Out 9:30 10:15   Pain   In / Out 1 0   Subjective Functional Status/Changes: \"Shoulder is a little sore today\"   Changes to:  Allergies, Med Hx, Sx Hx?   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     OBJECTIVE  Therapeutic Procedures:  Tx Min Billable or 1:1 Min (if diff from Tx Min) Procedure, Rationale, Specifics   25 18 69441 Therapeutic Exercise (timed):  increase ROM, strength, coordination, balance, and proprioception to improve patient's ability to progress to PLOF and address remaining functional goals. (see flow sheet as applicable)    Details if applicable:       10 10 59407 Neuromuscular Re-Education (timed):  improve balance, coordination, kinesthetic sense, posture, core stability and proprioception to improve patient's ability to develop conscious control of individual muscles and awareness of position of extremities in order to progress to PLOF and address remaining functional goals. (see flow sheet as applicable)    Details if applicable:     10 10 23822 Therapeutic Activity (timed):  use of dynamic activities replicating functional movements to increase ROM, strength, coordination, balance, and proprioception in order to improve patient's ability to progress to PLOF and address remaining functional goals.  (see flow sheet as applicable)     Details if applicable:           Details if applicable:

## 2025-06-19 ENCOUNTER — HOSPITAL ENCOUNTER (OUTPATIENT)
Facility: HOSPITAL | Age: 80
Setting detail: RECURRING SERIES
Discharge: HOME | End: 2025-06-22
Payer: MEDICARE

## 2025-06-19 PROCEDURE — 97530 THERAPEUTIC ACTIVITIES: CPT

## 2025-06-19 PROCEDURE — 97110 THERAPEUTIC EXERCISES: CPT

## 2025-06-19 PROCEDURE — 97112 NEUROMUSCULAR REEDUCATION: CPT

## 2025-06-19 NOTE — PROGRESS NOTES
[facility]  [address] [address]  Phone: [phone]   Fax: [phone]      Continued Plan of Care/ Re-certification for Physical Therapy Services    Patient name: Lupe Rothman Start of Care: 3/27/2025   Referral source: Dannie James MD : 1945   Medical/Treatment Diagnosis: Aftercare following joint replacement surgery  Presence of left artificial shoulder joint  Pain in left shoulder Onset Date:25      Prior Hospitalization: see medical history Provider#: [id number]   Comorbidities: Musculoskeletal disorders and Other:  right knee OA, drained baker's  cyst and steroid injection 3/27/25 to right knee, right shoulder reverse TSA in , Cataract surgeries for B eyes, Right torn retina, Prostate cancer proton therapy in the past , 6 month checkup doing well, HTN (well controlled), high cholesterol (medicated)   Prior Level of Function::functionally independent, no AD, semi-active lifestyle     Visits from Start of Care: 22        Reporting Period: 3/27/25 to 25    The Plan of Care and following information is based on the patient's current status:    Key functional changes: improving shoulder ROM, strength, self reported function      Problems/ barriers to goal attainment: slow progress with ROM and strength with pain with terminal range motion     Problem List: pain affecting function, decrease ROM, decrease strength, decrease ADL/functional abilities, decrease activity tolerance, and decrease flexibility/joint mobility    Treatment Plan: Therapeutic exercise, Neuromuscular reeducation, Manual therapy, Therapeutic activity, and Self care/home management    Goals for this certification period to be accomplished in 8 treatments    Primary mitigating factor which impeded progress:  None of these apply from preset list (refer to note for other details)      Frequency / Duration: Patient to be seen 1 times per week for up to 8

## 2025-06-19 NOTE — PROGRESS NOTES
PHYSICAL / OCCUPATIONAL THERAPY - DAILY TREATMENT NOTE     Patient Name: Lupe Rothman    Date: 2025    : 1945  Insurance: Payor: MEDICARE / Plan: MEDICARE PART A AND B / Product Type: *No Product type* /      Patient  verified Yes     Visit #   Current / Total 22 24   Time   In / Out 9:30 am 10:30 am   Pain   In / Out 0 0   Subjective Functional Status/Changes: Pt reports he is doing well though still has some shoulder pain with certain movements (demonstrates shoulder ER and IR to end range). States he still has some trouble reaching for high shelves.   Changes to:  Allergies, Med Hx, Sx Hx?   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     OBJECTIVE  Therapeutic Procedures:  Tx Min Billable or 1:1 Min (if diff from Tx Min) Procedure, Rationale, Specifics   35 18 80937 Therapeutic Exercise (timed):  increase ROM, strength, coordination, balance, and proprioception to improve patient's ability to progress to PLOF and address remaining functional goals. (see flow sheet as applicable)    Details if applicable:       15 10 25647 Neuromuscular Re-Education (timed):  improve balance, coordination, kinesthetic sense, posture, core stability and proprioception to improve patient's ability to develop conscious control of individual muscles and awareness of position of extremities in order to progress to PLOF and address remaining functional goals. (see flow sheet as applicable)    Details if applicable:     10 10 91670 Therapeutic Activity (timed):  use of dynamic activities replicating functional movements to increase ROM, strength, coordination, balance, and proprioception in order to improve patient's ability

## 2025-06-23 ENCOUNTER — APPOINTMENT (OUTPATIENT)
Facility: HOSPITAL | Age: 80
End: 2025-06-23
Payer: MEDICARE

## 2025-06-26 ENCOUNTER — HOSPITAL ENCOUNTER (OUTPATIENT)
Facility: HOSPITAL | Age: 80
Setting detail: RECURRING SERIES
Discharge: HOME | End: 2025-06-29
Payer: MEDICARE

## 2025-06-26 PROCEDURE — 97110 THERAPEUTIC EXERCISES: CPT

## 2025-06-26 PROCEDURE — 97112 NEUROMUSCULAR REEDUCATION: CPT

## 2025-06-26 PROCEDURE — 97530 THERAPEUTIC ACTIVITIES: CPT

## 2025-06-26 NOTE — PROGRESS NOTES
PHYSICAL / OCCUPATIONAL THERAPY - DAILY TREATMENT NOTE (updated )    Patient Name: Lupe Rothman    Date: 2025    : 1945  Insurance: Payor: MEDICARE / Plan: MEDICARE PART A AND B / Product Type: *No Product type* /      Patient  verified Yes     Visit #   Current / Total 23 30   Time   In / Out 0928 1020   Pain   In / Out 0 0   Subjective Functional Status/Changes: \"I do feel it is steadily getting better but I can tell I still have a ways to go.\"   Changes to:  Meds, Allergies, Med Hx, Sx Hx?  If yes, update Summary List no       TREATMENT AREA =  Aftercare following joint replacement surgery [Z47.1]  Presence of left artificial shoulder joint [Z96.612]  Pain in left shoulder [M25.512]    If an interpreting service is utilized for treatment of this patient, the contents of this document represent the material reviewed with the patient via the .     OBJECTIVE    Therapeutic Procedures:  Tx Min Billable or 1:1 Min (if diff from Tx Min) Procedure, Rationale, Specifics   22 16 88534 Therapeutic Exercise (timed):  increase ROM, strength, coordination, balance, and proprioception to improve patient's ability to progress to PLOF and address remaining functional goals. (see flow sheet as applicable)     Details if applicable:       15  07456 Therapeutic Activity (timed):  use of dynamic activities replicating functional movements to increase ROM, strength, coordination, balance, and proprioception in order to improve patient's ability to progress to PLOF and address remaining functional goals.  (see flow sheet as applicable)     Details if applicable:     15  06962 Neuromuscular Re-Education (timed):  improve balance, coordination, kinesthetic sense, posture, core stability and proprioception to improve patient's ability to develop conscious control of individual muscles and awareness of position of extremities in order to progress to PLOF and address remaining functional goals. (see flow

## 2025-06-30 ENCOUNTER — APPOINTMENT (OUTPATIENT)
Facility: HOSPITAL | Age: 80
End: 2025-06-30
Payer: MEDICARE

## 2025-07-03 ENCOUNTER — HOSPITAL ENCOUNTER (OUTPATIENT)
Facility: HOSPITAL | Age: 80
Setting detail: RECURRING SERIES
Discharge: HOME | End: 2025-07-06
Payer: MEDICARE

## 2025-07-03 PROCEDURE — 97530 THERAPEUTIC ACTIVITIES: CPT

## 2025-07-03 PROCEDURE — 97112 NEUROMUSCULAR REEDUCATION: CPT

## 2025-07-03 PROCEDURE — 97110 THERAPEUTIC EXERCISES: CPT

## 2025-07-03 NOTE — PROGRESS NOTES
PHYSICAL / OCCUPATIONAL THERAPY - DAILY TREATMENT NOTE     Patient Name: Lupe Rothman    Date: 7/3/2025    : 1945  Insurance: Payor: MEDICARE / Plan: MEDICARE PART A AND B / Product Type: *No Product type* /      Patient  verified Yes     Visit #   Current / Total 24 30   Time   In / Out 9:30 10:15   Pain   In / Out 0 \"<1\"   Subjective Functional Status/Changes: Pt reports at home stretches are going well.    Changes to:  Allergies, Med Hx, Sx Hx?   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     OBJECTIVE  Therapeutic Procedures:  Tx Min Billable or 1:1 Min (if diff from Tx Min) Procedure, Rationale, Specifics   13 13 38203 Therapeutic Exercise (timed):  increase ROM, strength, coordination, balance, and proprioception to improve patient's ability to progress to PLOF and address remaining functional goals. (see flow sheet as applicable)    Details if applicable:       10 10 56169 Neuromuscular Re-Education (timed):  improve balance, coordination, kinesthetic sense, posture, core stability and proprioception to improve patient's ability to develop conscious control of individual muscles and awareness of position of extremities in order to progress to PLOF and address remaining functional goals. (see flow sheet as applicable)    Details if applicable:      18414 Therapeutic Activity (timed):  use of dynamic activities replicating functional movements to increase ROM, strength, coordination, balance, and proprioception in order to improve patient's ability to progress to PLOF and address remaining functional goals.  (see flow sheet as applicable)     Details if applicable:     45 40 [facility] Totals Reminder: 
sheet as applicable)      Details if applicable:     45 40 [facility] Totals Reminder: bill using total billable min of TIMED therapeutic procedures (example: do not include dry needle or estim unattended, both untimed codes, in totals to left)  8-22 min = 1 unit; 23-37 min = 2 units; 38-52 min = 3 units; 53-67 min = 4 units; 68-82 min = 5 units   Total Total       TOTAL TREATMENT TIME:        45      Charge Capture     [x]  Patient Education billed concurrently with other procedures   [x] Review HEP    [] Progressed/Changed HEP, detail:    [] Other detail:        Objective Information/Functional Measures/Assessment     Pt tolerated session well noting some pain at end ranges of shoulder ROM. Pt required minimal cues regarding exercise mechanics, specifically with latissimus dorsi stretches. Pt stated he will continue with stretches at home to improve shoulder ROM. Will continue to progress strengthening exercises at next visit.      Patient will continue to benefit from skilled PT / OT services to modify and progress therapeutic interventions, analyze and address functional mobility deficits, analyze and address ROM deficits, analyze and address strength deficits, analyze and address soft tissue restrictions, analyze and cue for proper movement patterns, and instruct in home and community integration to address functional deficits and attain remaining goals.     Progress toward goals / Updated goals:  []  See Progress Note/Recertification     Short Term Goals: To be accomplished in 8 treatments:  Patient will be independent and compliant with HEP to progress toward goals and restore functional mobility.   Eval Status: issued at eval  Current: Met, 6/16/2025      Patient will demonstrate left shoulder flexion PROM to 140 to improve ease of [place] reach.   Eval Status: 115  Current: Regressed 128 degrees 6/26/25     Pt will demonstrate ABD PROM to 130 to aid in functional mechanics for overhead reach..  Eval Status:

## 2025-07-08 ENCOUNTER — TELEPHONE (OUTPATIENT)
Facility: HOSPITAL | Age: 80
End: 2025-07-08

## 2025-07-08 ENCOUNTER — HOSPITAL ENCOUNTER (OUTPATIENT)
Facility: HOSPITAL | Age: 80
Setting detail: RECURRING SERIES
End: 2025-07-08
Payer: MEDICARE

## 2025-07-08 NOTE — TELEPHONE ENCOUNTER
Called to check in as pt missed 9:30 appt but pt said that was mistake on jim as PT informed pt only needed 1X / week & any future Tues appts need to be taken off [facility]. Confirmed all future appts on Thurs.

## 2025-07-10 ENCOUNTER — HOSPITAL ENCOUNTER (OUTPATIENT)
Facility: HOSPITAL | Age: 80
Setting detail: RECURRING SERIES
Discharge: HOME | End: 2025-07-13
Payer: MEDICARE

## 2025-07-10 PROCEDURE — 97110 THERAPEUTIC EXERCISES: CPT

## 2025-07-10 PROCEDURE — 97112 NEUROMUSCULAR REEDUCATION: CPT

## 2025-07-10 PROCEDURE — 97530 THERAPEUTIC ACTIVITIES: CPT

## 2025-07-10 NOTE — PROGRESS NOTES
PHYSICAL / OCCUPATIONAL THERAPY - DAILY TREATMENT NOTE     PHYSICAL / OCCUPATIONAL THERAPY - DAILY TREATMENT NOTE      Patient Name: Lupe Rothman    Date: 7/3/2025    : 1945  Insurance: Payor: MEDICARE / Plan: MEDICARE PART A AND B / Product Type: *No Product type* /             Patient  verified Yes      Visit #   Current / Total 25 30    Time   In / Out 9:28 am 10:20 am    Pain   In / Out 1 0    Subjective Functional Status/Changes: Pt reports he has been doing well noting he still is a little tight and has some discomfort in his shoulder when raising his arm to end range.   Changes to:  Allergies, Med Hx, Sx Hx?    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      OBJECTIVE  Therapeutic Procedures:  Tx Min Billable or 1:1 Min (if diff from Tx Min) Procedure, Rationale, Specifics    26506 Therapeutic Exercise (timed):  increase ROM, strength, coordination, balance, and proprioception to improve patient's ability to progress to PLOF and address remaining functional goals. (see flow sheet as applicable)     Details if applicable:        10 10 82422 Neuromuscular Re-Education (timed):  improve balance, coordination, kinesthetic sense, posture, core stability and proprioception to improve patient's ability to develop conscious control of individual muscles and awareness of position of extremities in order to progress to PLOF and address remaining functional goals. (see flow sheet as applicable)     Details if applicable:     20 10 04572 Therapeutic Activity (timed):  use of dynamic activities replicating functional movements to increase ROM, strength, coordination, balance, and proprioception in order to

## 2025-07-14 ENCOUNTER — APPOINTMENT (OUTPATIENT)
Facility: HOSPITAL | Age: 80
End: 2025-07-14
Payer: MEDICARE

## 2025-07-15 ENCOUNTER — HOSPITAL ENCOUNTER (OUTPATIENT)
Facility: HOSPITAL | Age: 80
Setting detail: RECURRING SERIES
Discharge: HOME | End: 2025-07-18
Payer: MEDICARE

## 2025-07-15 PROCEDURE — 97110 THERAPEUTIC EXERCISES: CPT

## 2025-07-15 PROCEDURE — 97530 THERAPEUTIC ACTIVITIES: CPT

## 2025-07-15 PROCEDURE — 97112 NEUROMUSCULAR REEDUCATION: CPT

## 2025-07-15 NOTE — PROGRESS NOTES
PHYSICAL / OCCUPATIONAL THERAPY - DAILY TREATMENT NOTE     PHYSICAL / OCCUPATIONAL THERAPY - DAILY TREATMENT NOTE      Patient Name: Lupe Rothman    Date: 7/3/2025    : 1945  Insurance: Payor: MEDICARE / Plan: MEDICARE PART A AND B / Product Type: *No Product type* /             Patient  verified Yes      Visit #   Current / Total 26 30    Time   In / Out 9:28 am 10:20 am    Pain   In / Out 1 0    Subjective Functional Status/Changes: Pt reports he has been doing well noting he still is a little tight and has some discomfort in his shoulder when raising his arm to end range.   Changes to:  Allergies, Med Hx, Sx Hx?    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      OBJECTIVE  Therapeutic Procedures:  Tx Min Billable or 1:1 Min (if diff from Tx Min) Procedure, Rationale, Specifics   22 14 56929 Therapeutic Exercise (timed):  increase ROM, strength, coordination, balance, and proprioception to improve patient's ability to progress to PLOF and address remaining functional goals. (see flow sheet as applicable)     Details if applicable:        10 10 20186 Neuromuscular Re-Education (timed):  improve balance, coordination, kinesthetic sense, posture, core stability and proprioception to improve patient's ability to develop conscious control of individual muscles and awareness of position of extremities in order to progress to PLOF and address remaining functional goals. (see flow sheet as applicable)     Details if applicable:      76014 Therapeutic Activity (timed):  use of dynamic activities replicating functional movements to increase ROM, strength, coordination, balance, and proprioception in order to

## 2025-07-17 ENCOUNTER — APPOINTMENT (OUTPATIENT)
Facility: HOSPITAL | Age: 80
End: 2025-07-17
Payer: MEDICARE

## 2025-07-21 ENCOUNTER — APPOINTMENT (OUTPATIENT)
Facility: HOSPITAL | Age: 80
End: 2025-07-21
Payer: MEDICARE

## 2025-07-24 ENCOUNTER — HOSPITAL ENCOUNTER (OUTPATIENT)
Facility: HOSPITAL | Age: 80
Setting detail: RECURRING SERIES
Discharge: HOME | End: 2025-07-27
Payer: MEDICARE

## 2025-07-24 PROCEDURE — 97110 THERAPEUTIC EXERCISES: CPT

## 2025-07-24 PROCEDURE — 97112 NEUROMUSCULAR REEDUCATION: CPT

## 2025-07-24 PROCEDURE — 97530 THERAPEUTIC ACTIVITIES: CPT

## 2025-07-24 NOTE — PROGRESS NOTES
PHYSICAL / OCCUPATIONAL THERAPY - DAILY TREATMENT NOTE     Patient Name: Lupe Rothman    Date: 2025    : 1945  Insurance: Payor: MEDICARE / Plan: MEDICARE PART A AND B / Product Type: *No Product type* /      Patient  verified Yes     Visit #   Current / Total 27 30   Time   In / Out 9:30 am 10:15 am   Pain   In / Out 0 0   Subjective Functional Status/Changes: Pt reports he has been doing his HEP and has been doing well. Notes he still sometimes gets a little pain when pushing his shoulder to end range but is improving.     TREATMENT AREA =  Aftercare following joint replacement surgery  Presence of left artificial shoulder joint  Pain in left shoulder    If an interpreting service is utilized for treatment of this patient, the contents of this document represent the material reviewed with the patient via the .     OBJECTIVE    Therapeutic Procedures:  Tx Min Billable or 1:1 Min (if diff from Tx Min) Procedure, Rationale, Specifics   20 15 90461 Therapeutic Exercise (timed):  increase ROM, strength, coordination, balance, and proprioception to improve patient's ability to progress to PLOF and address remaining functional goals. (see flow sheet as applicable)    Details if applicable:       10  25494 Neuromuscular Re-Education (timed):  improve balance, coordination, kinesthetic sense, posture, core stability and proprioception to improve patient's ability to develop conscious control of individual muscles and awareness of position of extremities in order to progress to PLOF and address remaining functional goals. (see flow sheet as applicable)    Details if applicable:     15  33114 Therapeutic Activity (timed):  use of dynamic activities replicating functional movements to increase ROM, strength, coordination, balance, and proprioception in order to improve patient's ability to progress to PLOF and address remaining functional goals.  (see flow sheet as applicable)     Details if 
Physical Therapy Discharge Instructions    [facility]  [address] [address]  Phone: [phone]   Fax: [phone]      Patient: Lupe Rothman  : 1945    Continue Home Exercise Program 3 times per week for 4 weeks      Continue with    [x] Ice  as needed      [x] Heat           Follow up with MD:     [] Upon completion of therapy     [x] As needed      Recommendations:     [x]   Return to activity with home program    []   Return to activity with the following modifications:       []Post Rehab Program    []Join Independent aquatic program     []Return to/join local gym      Additional Comments: Please contact clinic at above phone number if you have any questions regarding Home Exercise Program or self care instructions.    
120 degs 6/16/25 progressing 125 left, 143 right 6/19/25 left 128 7/10/25 left 136 degrees, near met 7/24/25     Pt will have 4/5 left shoulder flexion strength to return to goals of improve ease of overhead lifting tasks..  Eval Status: 2+/5  Current: MET 4/5 7/24/25    Assessment/ Summary of Care: Pt as attended 27 Pt appts post left reverse TSA demonstrating gradually improving strength and ROM though he has had some prior regressions in ROM that have slowly improved since. He notes minimal pain only occasionally when pushing end range motion. At this time, pt has been instructed in updated HEP for self management and will be DC from skilled care.    RECOMMENDATIONS:  [x]Discontinue therapy: [x]Patient has reached or is progressing toward set goals      []Patient is non-compliant or has abdicated      []Due to lack of appreciable progress towards set goals    Christopher Villafana, PT 7/24/2025 10:43 AM

## 2025-07-28 ENCOUNTER — APPOINTMENT (OUTPATIENT)
Facility: HOSPITAL | Age: 80
End: 2025-07-28
Payer: MEDICARE

## 2025-07-31 ENCOUNTER — APPOINTMENT (OUTPATIENT)
Facility: HOSPITAL | Age: 80
End: 2025-07-31
Payer: MEDICARE

## (undated) DEVICE — GARMENT,MEDLINE,DVT,INT,CALF,MED, GEN2: Brand: MEDLINE

## (undated) DEVICE — SYRINGE,TOOMEY,IRRIGATION,70CC,STERILE: Brand: MEDLINE

## (undated) DEVICE — SPONGE GZ W4XL4IN RAYON POLY 4 PLY NONWOVEN FASTER WICKING

## (undated) DEVICE — Device

## (undated) DEVICE — MAJ-1414 SINGLE USE ADPATER BIOPSY VALV: Brand: SINGLE USE ADAPTOR BIOPSY VALVE

## (undated) DEVICE — Z DUP USE 2715602 GUIDEWIRE SURG L220MM DIA25MM SHLDR FOR GLEN KEELED AEQUALIS

## (undated) DEVICE — BASEPLATE GLEN OD25MM 15DEG HALF WDG AUG REVERSED AEQUALIS: Type: IMPLANTABLE DEVICE | Site: SHOULDER | Status: NON-FUNCTIONAL

## (undated) DEVICE — TOWEL: OR BLU 80/CS: Brand: MEDICAL ACTION INDUSTRIES

## (undated) DEVICE — SUT MCRYL + 3-0 27IN PS1 UD --

## (undated) DEVICE — NDL FLTR TIP 5 MIC 18GX1.5IN --

## (undated) DEVICE — SINGLE PORT MANIFOLD: Brand: NEPTUNE 2

## (undated) DEVICE — NEEDLE HYPO 18GA L1.5IN PNK POLYPR HUB S STL THN WALL FILL

## (undated) DEVICE — REM POLYHESIVE ADULT PATIENT RETURN ELECTRODE: Brand: VALLEYLAB

## (undated) DEVICE — STRAP,POSITIONING,KNEE/BODY,FOAM,4X60": Brand: MEDLINE

## (undated) DEVICE — SOL IRRIGATION INJ NACL 0.9% 500ML BTL

## (undated) DEVICE — KENDALL SCD EXPRESS SLEEVES, KNEE LENGTH, MEDIUM: Brand: KENDALL SCD

## (undated) DEVICE — GOWN,SIRUS,NONRNF,SETINSLV,XL,20/CS: Brand: MEDLINE

## (undated) DEVICE — MAX-CORE® DISPOSABLE CORE BIOPSY INSTRUMENT, 18G X 25CM: Brand: MAX-CORE

## (undated) DEVICE — SKIN MARKER,REGULAR TIP WITH RULER AND LABELS: Brand: DEVON

## (undated) DEVICE — SYR 10ML LUER LOK 1/5ML GRAD --

## (undated) DEVICE — STERILE POLYISOPRENE POWDER-FREE SURGICAL GLOVES: Brand: PROTEXIS

## (undated) DEVICE — Z DUP USE 2715828 BIT DRL DIA3.2MM PERIPH SCR FOR AEQUALIS PERFORM REVERSED

## (undated) DEVICE — SYR 10ML CTRL LR LCK NSAF LF --

## (undated) DEVICE — SUT VCRL + 2-0 36IN CT1 UD --

## (undated) DEVICE — GUIDE PIN 3X75MM SS -- SIMPLICITI

## (undated) DEVICE — OPTIFOAM GENTLE SA, POSTOP, 4X8: Brand: MEDLINE

## (undated) DEVICE — STERILE MEDICINE CUP 2 OZ KIT: Brand: CARDINAL HEALTH

## (undated) DEVICE — CATHETER IV 22GA L1IN BLU POLYUR STR HUB RADPQ PROTCT +

## (undated) DEVICE — SYR 3ML LL TIP 1/10ML GRAD --

## (undated) DEVICE — 3M™ STERI-DRAPE™ U-DRAPE 1015: Brand: STERI-DRAPE™

## (undated) DEVICE — TRNQT TEXT 1X18IN BLU LF DISP -- CONVERT TO ITEM 362165

## (undated) DEVICE — PREP SKN CHLRAPRP APL 26ML STR --

## (undated) DEVICE — SYRINGE 50ML E/T

## (undated) DEVICE — HOOD, PEEL-AWAY: Brand: FLYTE

## (undated) DEVICE — SOLUTION IV 500ML 0.9% SOD CHL FLX CONT

## (undated) DEVICE — SYRINGE 30ML CLR FIX M LUER CONN FLAT GRP PLUNG MEDALLION N

## (undated) DEVICE — DEVON™ KNEE AND BODY STRAP 60" X 3" (1.5 M X 7.6 CM): Brand: DEVON

## (undated) DEVICE — PEEL-AWAY TOGA, X-LARGE: Brand: FLYTE

## (undated) DEVICE — WRISTBAND ID AD W2.5XL9.5CM RED VYN ADH CLSR UNI-PRINT

## (undated) DEVICE — SUTURE PROL SZ 3-0 L18IN NONABSORBABLE BLU L19MM PC-5 3/8 8632G

## (undated) DEVICE — 2108 SERIES SAGITTAL BLADE (24.8 X 1.24 X 80.1MM)

## (undated) DEVICE — MAYO STAND COVER: Brand: CONVERTORS

## (undated) DEVICE — NDL PRT INJ NSAF BLNT 18GX1.5 --

## (undated) DEVICE — 3M™ STERI-DRAPE™ INSTRUMENT POUCH 1018: Brand: STERI-DRAPE™

## (undated) DEVICE — STERILE POLYISOPRENE POWDER-FREE SURGICAL GLOVES WITH EMOLLIENT COATING: Brand: PROTEXIS

## (undated) DEVICE — STRIP,CLOSURE,WOUND,MEDI-STRIP,1/2X4: Brand: MEDLINE

## (undated) DEVICE — SPONGE GZ W4XL4IN COT 12 PLY TYP VII WVN C FLD DSGN

## (undated) DEVICE — 3M™ IOBAN™ 2 ANTIMICROBIAL INCISE DRAPE 6650EZ: Brand: IOBAN™ 2

## (undated) DEVICE — NEEDLE HYPO 21GA L1.5IN GRN POLYPR HUB S STL REG BVL STR

## (undated) DEVICE — SET ADMIN 16ML TBNG L100IN 2 Y INJ SITE IV PIGGY BK DISP (ORDER IN MULIPLES OF 48)

## (undated) DEVICE — KENDALL RADIOLUCENT FOAM MONITORING ELECTRODE RECTANGULAR SHAPE: Brand: KENDALL

## (undated) DEVICE — HANDPIECE SET WITH HIGH FLOW TIP AND SUCTION TUBE: Brand: INTERPULSE

## (undated) DEVICE — PAD,NON-ADHERENT,3X8,STERILE,LF,1/PK: Brand: MEDLINE

## (undated) DEVICE — DRAPE TWL SURG 16X26IN BLU ORB04] ALLCARE INC]

## (undated) DEVICE — CANNULA CUSH AD W/ 14FT TBG

## (undated) DEVICE — CATH SUC CTRL PRT TRIFLO 14FR --

## (undated) DEVICE — SYR 5ML 1/5 GRAD LL NSAF LF --

## (undated) DEVICE — TUBING, SUCTION, 1/4" X 12', STRAIGHT: Brand: MEDLINE

## (undated) DEVICE — GUIDE GLEN REVERSED BLUEPRINT

## (undated) DEVICE — TRAP SPEC COLL POLYP POLYSTYR --

## (undated) DEVICE — SOL IRR STRL H2O 1500ML BTL --

## (undated) DEVICE — PACK PROCEDURE SURG TOT SHLDR CUST